# Patient Record
Sex: FEMALE | Race: WHITE | NOT HISPANIC OR LATINO
[De-identification: names, ages, dates, MRNs, and addresses within clinical notes are randomized per-mention and may not be internally consistent; named-entity substitution may affect disease eponyms.]

---

## 2017-01-24 ENCOUNTER — APPOINTMENT (OUTPATIENT)
Dept: OBGYN | Facility: CLINIC | Age: 47
End: 2017-01-24

## 2017-01-24 VITALS
SYSTOLIC BLOOD PRESSURE: 122 MMHG | HEART RATE: 85 BPM | WEIGHT: 234 LBS | DIASTOLIC BLOOD PRESSURE: 82 MMHG | HEIGHT: 67 IN | BODY MASS INDEX: 36.73 KG/M2

## 2017-01-26 LAB — HPV HIGH+LOW RISK DNA PNL CVX: NEGATIVE

## 2017-01-30 LAB — CYTOLOGY CVX/VAG DOC THIN PREP: NORMAL

## 2017-01-31 ENCOUNTER — APPOINTMENT (OUTPATIENT)
Dept: OBGYN | Facility: CLINIC | Age: 47
End: 2017-01-31

## 2017-02-13 ENCOUNTER — APPOINTMENT (OUTPATIENT)
Dept: OBGYN | Facility: CLINIC | Age: 47
End: 2017-02-13

## 2017-02-13 VITALS
BODY MASS INDEX: 35.63 KG/M2 | HEIGHT: 67 IN | DIASTOLIC BLOOD PRESSURE: 70 MMHG | SYSTOLIC BLOOD PRESSURE: 102 MMHG | WEIGHT: 227 LBS

## 2017-03-07 ENCOUNTER — FORM ENCOUNTER (OUTPATIENT)
Age: 47
End: 2017-03-07

## 2017-03-08 ENCOUNTER — OUTPATIENT (OUTPATIENT)
Dept: OUTPATIENT SERVICES | Facility: HOSPITAL | Age: 47
LOS: 1 days | End: 2017-03-08
Payer: COMMERCIAL

## 2017-03-08 VITALS
WEIGHT: 229.28 LBS | HEART RATE: 73 BPM | HEIGHT: 67 IN | TEMPERATURE: 99 F | RESPIRATION RATE: 16 BRPM | DIASTOLIC BLOOD PRESSURE: 80 MMHG | SYSTOLIC BLOOD PRESSURE: 124 MMHG

## 2017-03-08 DIAGNOSIS — Z90.5 ACQUIRED ABSENCE OF KIDNEY: Chronic | ICD-10-CM

## 2017-03-08 DIAGNOSIS — N92.1 EXCESSIVE AND FREQUENT MENSTRUATION WITH IRREGULAR CYCLE: ICD-10-CM

## 2017-03-08 DIAGNOSIS — J45.909 UNSPECIFIED ASTHMA, UNCOMPLICATED: ICD-10-CM

## 2017-03-08 DIAGNOSIS — Z01.818 ENCOUNTER FOR OTHER PREPROCEDURAL EXAMINATION: ICD-10-CM

## 2017-03-08 DIAGNOSIS — D30.02 BENIGN NEOPLASM OF LEFT KIDNEY: Chronic | ICD-10-CM

## 2017-03-08 DIAGNOSIS — C50.919 MALIGNANT NEOPLASM OF UNSPECIFIED SITE OF UNSPECIFIED FEMALE BREAST: ICD-10-CM

## 2017-03-08 DIAGNOSIS — Z90.13 ACQUIRED ABSENCE OF BILATERAL BREASTS AND NIPPLES: Chronic | ICD-10-CM

## 2017-03-08 DIAGNOSIS — Z98.890 OTHER SPECIFIED POSTPROCEDURAL STATES: Chronic | ICD-10-CM

## 2017-03-08 LAB
ANION GAP SERPL CALC-SCNC: 11 MMOL/L — SIGNIFICANT CHANGE UP (ref 5–17)
APTT BLD: 31.9 SEC — SIGNIFICANT CHANGE UP (ref 27.5–37.4)
BUN SERPL-MCNC: 16 MG/DL — SIGNIFICANT CHANGE UP (ref 8–20)
CALCIUM SERPL-MCNC: 9.1 MG/DL — SIGNIFICANT CHANGE UP (ref 8.6–10.2)
CHLORIDE SERPL-SCNC: 105 MMOL/L — SIGNIFICANT CHANGE UP (ref 98–107)
CO2 SERPL-SCNC: 26 MMOL/L — SIGNIFICANT CHANGE UP (ref 22–29)
CREAT SERPL-MCNC: 0.78 MG/DL — SIGNIFICANT CHANGE UP (ref 0.5–1.3)
GLUCOSE SERPL-MCNC: 88 MG/DL — SIGNIFICANT CHANGE UP (ref 70–115)
HCT VFR BLD CALC: 38.6 % — SIGNIFICANT CHANGE UP (ref 37–47)
HGB BLD-MCNC: 12.5 G/DL — SIGNIFICANT CHANGE UP (ref 12–16)
INR BLD: 1.09 RATIO — SIGNIFICANT CHANGE UP (ref 0.88–1.16)
MCHC RBC-ENTMCNC: 29.6 PG — SIGNIFICANT CHANGE UP (ref 27–31)
MCHC RBC-ENTMCNC: 32.4 G/DL — SIGNIFICANT CHANGE UP (ref 32–36)
MCV RBC AUTO: 91.3 FL — SIGNIFICANT CHANGE UP (ref 81–99)
PLATELET # BLD AUTO: 248 K/UL — SIGNIFICANT CHANGE UP (ref 150–400)
POTASSIUM SERPL-MCNC: 4.1 MMOL/L — SIGNIFICANT CHANGE UP (ref 3.5–5.3)
POTASSIUM SERPL-SCNC: 4.1 MMOL/L — SIGNIFICANT CHANGE UP (ref 3.5–5.3)
PROTHROM AB SERPL-ACNC: 12 SEC — SIGNIFICANT CHANGE UP (ref 10–13.1)
RBC # BLD: 4.23 M/UL — LOW (ref 4.4–5.2)
RBC # FLD: 14.5 % — SIGNIFICANT CHANGE UP (ref 11–15.6)
SODIUM SERPL-SCNC: 142 MMOL/L — SIGNIFICANT CHANGE UP (ref 135–145)
WBC # BLD: 10.6 K/UL — SIGNIFICANT CHANGE UP (ref 4.8–10.8)
WBC # FLD AUTO: 10.6 K/UL — SIGNIFICANT CHANGE UP (ref 4.8–10.8)

## 2017-03-08 PROCEDURE — 93005 ELECTROCARDIOGRAM TRACING: CPT

## 2017-03-08 PROCEDURE — 85610 PROTHROMBIN TIME: CPT

## 2017-03-08 PROCEDURE — 85027 COMPLETE CBC AUTOMATED: CPT

## 2017-03-08 PROCEDURE — 85730 THROMBOPLASTIN TIME PARTIAL: CPT

## 2017-03-08 PROCEDURE — G0463: CPT

## 2017-03-08 PROCEDURE — 93010 ELECTROCARDIOGRAM REPORT: CPT

## 2017-03-08 PROCEDURE — 71020: CPT | Mod: 26

## 2017-03-08 PROCEDURE — 71046 X-RAY EXAM CHEST 2 VIEWS: CPT

## 2017-03-08 PROCEDURE — 80048 BASIC METABOLIC PNL TOTAL CA: CPT

## 2017-03-08 NOTE — H&P PST ADULT - PSH
H/O partial nephrectomy  Left  Kidney tumor (benign), left    S/P lumbar discectomy  L5-S1  S/P mastectomy, bilateral  Reconstructive breast surgery

## 2017-03-08 NOTE — H&P PST ADULT - NSANTHOSAYNRD_GEN_A_CORE
No. LESLIE screening performed.  STOP BANG Legend: 0-2 = LOW Risk; 3-4 = INTERMEDIATE Risk; 5-8 = HIGH Risk

## 2017-03-08 NOTE — H&P PST ADULT - FAMILY HISTORY
Mother  Still living? No  Family history of colon cancer, Age at diagnosis: 41-50  Family history of hypertension, Age at diagnosis: Age Unknown     Father  Still living? No  Family history of thyroid cancer, Age at diagnosis: 31-40  Family history of cerebrovascular accident (CVA), Age at diagnosis: Age Unknown

## 2017-03-08 NOTE — H&P PST ADULT - MAMMOGRAM, RESULTS OF LAST, PROFILE
Diagnosed with Bilateral breast cancer   (No longer gets mammography as pt has own breast tissue from reconstructive breast surgery)

## 2017-03-08 NOTE — H&P PST ADULT - HISTORY OF PRESENT ILLNESS
This is a 46 y.o female who presents to PST today.  The pt reports she has been experiencing menometrorrhagia and is now scheduled to have a D & C in the near future.  A recent sonogram demonstrated a small polyp for which she will also have a polypectomy.

## 2017-03-09 DIAGNOSIS — Z01.818 ENCOUNTER FOR OTHER PREPROCEDURAL EXAMINATION: ICD-10-CM

## 2017-03-09 DIAGNOSIS — N92.0 EXCESSIVE AND FREQUENT MENSTRUATION WITH REGULAR CYCLE: ICD-10-CM

## 2017-03-21 ENCOUNTER — RESULT REVIEW (OUTPATIENT)
Age: 47
End: 2017-03-21

## 2017-03-22 ENCOUNTER — TRANSCRIPTION ENCOUNTER (OUTPATIENT)
Age: 47
End: 2017-03-22

## 2017-03-22 ENCOUNTER — OUTPATIENT (OUTPATIENT)
Dept: OUTPATIENT SERVICES | Facility: HOSPITAL | Age: 47
LOS: 1 days | End: 2017-03-22
Payer: COMMERCIAL

## 2017-03-22 VITALS
SYSTOLIC BLOOD PRESSURE: 122 MMHG | OXYGEN SATURATION: 97 % | HEART RATE: 76 BPM | TEMPERATURE: 99 F | DIASTOLIC BLOOD PRESSURE: 77 MMHG | RESPIRATION RATE: 15 BRPM

## 2017-03-22 VITALS
SYSTOLIC BLOOD PRESSURE: 120 MMHG | DIASTOLIC BLOOD PRESSURE: 68 MMHG | TEMPERATURE: 98 F | HEART RATE: 82 BPM | RESPIRATION RATE: 16 BRPM | HEIGHT: 67 IN | WEIGHT: 220.02 LBS | OXYGEN SATURATION: 96 %

## 2017-03-22 DIAGNOSIS — N84.0 POLYP OF CORPUS UTERI: ICD-10-CM

## 2017-03-22 DIAGNOSIS — D30.02 BENIGN NEOPLASM OF LEFT KIDNEY: Chronic | ICD-10-CM

## 2017-03-22 DIAGNOSIS — Z90.13 ACQUIRED ABSENCE OF BILATERAL BREASTS AND NIPPLES: Chronic | ICD-10-CM

## 2017-03-22 DIAGNOSIS — N92.0 EXCESSIVE AND FREQUENT MENSTRUATION WITH REGULAR CYCLE: ICD-10-CM

## 2017-03-22 DIAGNOSIS — N92.1 EXCESSIVE AND FREQUENT MENSTRUATION WITH IRREGULAR CYCLE: ICD-10-CM

## 2017-03-22 DIAGNOSIS — Z98.890 OTHER SPECIFIED POSTPROCEDURAL STATES: Chronic | ICD-10-CM

## 2017-03-22 DIAGNOSIS — Z90.5 ACQUIRED ABSENCE OF KIDNEY: Chronic | ICD-10-CM

## 2017-03-22 PROCEDURE — 58558 HYSTEROSCOPY BIOPSY: CPT

## 2017-03-22 PROCEDURE — 88305 TISSUE EXAM BY PATHOLOGIST: CPT | Mod: 26

## 2017-03-22 PROCEDURE — 88305 TISSUE EXAM BY PATHOLOGIST: CPT

## 2017-03-22 RX ORDER — TAMOXIFEN CITRATE 20 MG/1
0 TABLET, FILM COATED ORAL
Qty: 0 | Refills: 0 | COMMUNITY

## 2017-03-22 RX ORDER — ZOLPIDEM TARTRATE 10 MG/1
0 TABLET ORAL
Qty: 0 | Refills: 0 | COMMUNITY

## 2017-03-22 RX ORDER — SODIUM CHLORIDE 9 MG/ML
1000 INJECTION, SOLUTION INTRAVENOUS
Qty: 0 | Refills: 0 | Status: DISCONTINUED | OUTPATIENT
Start: 2017-03-22 | End: 2017-03-22

## 2017-03-22 RX ORDER — PANTOPRAZOLE SODIUM 20 MG/1
1 TABLET, DELAYED RELEASE ORAL
Qty: 0 | Refills: 0 | COMMUNITY

## 2017-03-22 RX ORDER — CYCLOBENZAPRINE HYDROCHLORIDE 10 MG/1
0 TABLET, FILM COATED ORAL
Qty: 0 | Refills: 0 | COMMUNITY

## 2017-03-22 RX ORDER — OXYCODONE HYDROCHLORIDE 5 MG/1
10 TABLET ORAL EVERY 6 HOURS
Qty: 0 | Refills: 0 | Status: DISCONTINUED | OUTPATIENT
Start: 2017-03-22 | End: 2017-03-22

## 2017-03-22 RX ORDER — SODIUM CHLORIDE 9 MG/ML
3 INJECTION INTRAMUSCULAR; INTRAVENOUS; SUBCUTANEOUS ONCE
Qty: 0 | Refills: 0 | Status: DISCONTINUED | OUTPATIENT
Start: 2017-03-22 | End: 2017-03-22

## 2017-03-22 RX ORDER — FENTANYL CITRATE 50 UG/ML
50 INJECTION INTRAVENOUS
Qty: 0 | Refills: 0 | Status: DISCONTINUED | OUTPATIENT
Start: 2017-03-22 | End: 2017-03-22

## 2017-03-22 RX ORDER — ONDANSETRON 8 MG/1
4 TABLET, FILM COATED ORAL ONCE
Qty: 0 | Refills: 0 | Status: DISCONTINUED | OUTPATIENT
Start: 2017-03-22 | End: 2017-03-22

## 2017-03-22 RX ADMIN — OXYCODONE HYDROCHLORIDE 10 MILLIGRAM(S): 5 TABLET ORAL at 09:25

## 2017-03-24 LAB — SURGICAL PATHOLOGY FINAL REPORT - CH: SIGNIFICANT CHANGE UP

## 2017-05-10 ENCOUNTER — LABORATORY RESULT (OUTPATIENT)
Age: 47
End: 2017-05-10

## 2017-05-10 ENCOUNTER — APPOINTMENT (OUTPATIENT)
Dept: OBGYN | Facility: CLINIC | Age: 47
End: 2017-05-10

## 2017-05-10 VITALS
BODY MASS INDEX: 35.47 KG/M2 | WEIGHT: 226 LBS | HEIGHT: 67 IN | SYSTOLIC BLOOD PRESSURE: 120 MMHG | DIASTOLIC BLOOD PRESSURE: 80 MMHG

## 2017-05-10 RX ORDER — TRANEXAMIC ACID 650 MG/1
650 TABLET ORAL
Qty: 10 | Refills: 0 | Status: DISCONTINUED | COMMUNITY
Start: 2017-05-10 | End: 2017-05-10

## 2017-12-07 ENCOUNTER — RX RENEWAL (OUTPATIENT)
Age: 47
End: 2017-12-07

## 2017-12-20 ENCOUNTER — APPOINTMENT (OUTPATIENT)
Dept: NEUROLOGY | Facility: CLINIC | Age: 47
End: 2017-12-20
Payer: COMMERCIAL

## 2017-12-20 VITALS
SYSTOLIC BLOOD PRESSURE: 124 MMHG | DIASTOLIC BLOOD PRESSURE: 82 MMHG | WEIGHT: 215 LBS | BODY MASS INDEX: 33.74 KG/M2 | HEIGHT: 67 IN

## 2017-12-20 PROCEDURE — 99214 OFFICE O/P EST MOD 30 MIN: CPT

## 2018-03-29 ENCOUNTER — NON-APPOINTMENT (OUTPATIENT)
Age: 48
End: 2018-03-29

## 2018-03-29 ENCOUNTER — APPOINTMENT (OUTPATIENT)
Dept: FAMILY MEDICINE | Facility: CLINIC | Age: 48
End: 2018-03-29
Payer: COMMERCIAL

## 2018-03-29 VITALS
SYSTOLIC BLOOD PRESSURE: 124 MMHG | HEART RATE: 74 BPM | DIASTOLIC BLOOD PRESSURE: 76 MMHG | BODY MASS INDEX: 35 KG/M2 | HEIGHT: 67 IN | WEIGHT: 223 LBS

## 2018-03-29 DIAGNOSIS — I89.0 LYMPHEDEMA, NOT ELSEWHERE CLASSIFIED: ICD-10-CM

## 2018-03-29 DIAGNOSIS — K29.70 GASTRITIS, UNSPECIFIED, W/OUT BLEEDING: ICD-10-CM

## 2018-03-29 DIAGNOSIS — Z87.891 PERSONAL HISTORY OF NICOTINE DEPENDENCE: ICD-10-CM

## 2018-03-29 DIAGNOSIS — I51.9 HEART DISEASE, UNSPECIFIED: ICD-10-CM

## 2018-03-29 PROCEDURE — 99213 OFFICE O/P EST LOW 20 MIN: CPT | Mod: 25

## 2018-03-29 PROCEDURE — 99386 PREV VISIT NEW AGE 40-64: CPT | Mod: 25

## 2018-03-29 PROCEDURE — 93000 ELECTROCARDIOGRAM COMPLETE: CPT

## 2018-03-29 RX ORDER — TORSEMIDE 10 MG/1
10 TABLET ORAL
Qty: 90 | Refills: 1 | Status: ACTIVE | COMMUNITY

## 2018-03-29 RX ORDER — POTASSIUM CHLORIDE 750 MG/1
10 CAPSULE, EXTENDED RELEASE ORAL
Qty: 30 | Refills: 0 | Status: ACTIVE | COMMUNITY

## 2018-03-29 RX ORDER — TRANEXAMIC ACID 650 MG/1
650 TABLET ORAL
Qty: 10 | Refills: 0 | Status: DISCONTINUED | COMMUNITY
Start: 2017-05-10 | End: 2018-03-29

## 2018-03-31 PROBLEM — K29.70 GASTRITIS: Status: ACTIVE | Noted: 2018-03-29

## 2018-04-06 ENCOUNTER — APPOINTMENT (OUTPATIENT)
Dept: OBGYN | Facility: CLINIC | Age: 48
End: 2018-04-06

## 2018-04-16 ENCOUNTER — APPOINTMENT (OUTPATIENT)
Dept: FAMILY MEDICINE | Facility: CLINIC | Age: 48
End: 2018-04-16
Payer: COMMERCIAL

## 2018-04-16 VITALS
HEART RATE: 70 BPM | OXYGEN SATURATION: 98 % | BODY MASS INDEX: 35 KG/M2 | HEIGHT: 67 IN | TEMPERATURE: 98.4 F | DIASTOLIC BLOOD PRESSURE: 78 MMHG | WEIGHT: 223 LBS | SYSTOLIC BLOOD PRESSURE: 120 MMHG

## 2018-04-16 PROCEDURE — 99214 OFFICE O/P EST MOD 30 MIN: CPT

## 2018-04-27 ENCOUNTER — APPOINTMENT (OUTPATIENT)
Dept: OBGYN | Facility: CLINIC | Age: 48
End: 2018-04-27
Payer: COMMERCIAL

## 2018-04-27 VITALS
HEIGHT: 67 IN | HEART RATE: 73 BPM | DIASTOLIC BLOOD PRESSURE: 75 MMHG | SYSTOLIC BLOOD PRESSURE: 114 MMHG | WEIGHT: 223 LBS | BODY MASS INDEX: 35 KG/M2

## 2018-04-27 PROCEDURE — 99396 PREV VISIT EST AGE 40-64: CPT

## 2018-04-27 RX ORDER — METHYLPREDNISOLONE 4 MG/1
4 TABLET ORAL
Qty: 1 | Refills: 0 | Status: COMPLETED | COMMUNITY
Start: 2018-04-16 | End: 2018-04-27

## 2018-04-30 LAB — HPV HIGH+LOW RISK DNA PNL CVX: NOT DETECTED

## 2018-05-03 LAB — CYTOLOGY CVX/VAG DOC THIN PREP: NORMAL

## 2018-06-28 ENCOUNTER — APPOINTMENT (OUTPATIENT)
Dept: DERMATOLOGY | Facility: CLINIC | Age: 48
End: 2018-06-28

## 2018-07-18 ENCOUNTER — APPOINTMENT (OUTPATIENT)
Dept: FAMILY MEDICINE | Facility: CLINIC | Age: 48
End: 2018-07-18
Payer: COMMERCIAL

## 2018-07-18 VITALS
DIASTOLIC BLOOD PRESSURE: 64 MMHG | HEART RATE: 99 BPM | BODY MASS INDEX: 35.51 KG/M2 | SYSTOLIC BLOOD PRESSURE: 106 MMHG | HEIGHT: 67 IN | WEIGHT: 226.25 LBS

## 2018-07-18 DIAGNOSIS — J06.9 ACUTE UPPER RESPIRATORY INFECTION, UNSPECIFIED: ICD-10-CM

## 2018-07-18 PROBLEM — J45.909 UNSPECIFIED ASTHMA, UNCOMPLICATED: Chronic | Status: ACTIVE | Noted: 2017-03-08

## 2018-07-18 PROBLEM — D30.02 BENIGN NEOPLASM OF LEFT KIDNEY: Chronic | Status: ACTIVE | Noted: 2017-03-08

## 2018-07-18 PROCEDURE — 99214 OFFICE O/P EST MOD 30 MIN: CPT

## 2018-07-18 RX ORDER — CYCLOBENZAPRINE HYDROCHLORIDE 10 MG/1
10 TABLET, FILM COATED ORAL 3 TIMES DAILY
Qty: 45 | Refills: 3 | Status: DISCONTINUED | COMMUNITY
Start: 2017-12-20 | End: 2018-07-18

## 2018-07-18 RX ORDER — PANTOPRAZOLE 40 MG/1
40 TABLET, DELAYED RELEASE ORAL
Refills: 0 | Status: DISCONTINUED | COMMUNITY
End: 2018-07-18

## 2018-07-30 PROBLEM — J06.9 ACUTE UPPER RESPIRATORY INFECTION: Status: RESOLVED | Noted: 2018-04-19 | Resolved: 2018-07-30

## 2018-07-30 NOTE — REVIEW OF SYSTEMS
[Fever] : no fever [Fatigue] : no fatigue [Chest Pain] : no chest pain [Palpitations] : no palpitations

## 2018-07-30 NOTE — HISTORY OF PRESENT ILLNESS
[FreeTextEntry1] : F/U FROM HOSPITAL [de-identified] : PRESENTING FOR FOLLOW-UP.  ON 7/10/18 - LEFT HIP RE[facility]  GETTING PHYSICAL THERAPY AT HOME - FIVE DAYS A WEEK.  HAS A VISITING NURSE TWICE A WEEK.  HAS HAD NO FALLS.  NOTES THAT SWELLING IN HER LEFT LEG HAS BEEN "GREAT" AND IMPROVED SINCE PROCEDURE.  HAD CARDIAC CLEARANCE - DR. GAVI BOCANEGRA - HAD EKG.  TAMOXIFEN ON HOLD FROM SURGERY.  NO FEVER, COUGH OR SOB, NO FALLS.  TAKING STOOL SOFTENER - COLACE.  HAD A BOWEL MOVEMENT YESTERDAY.  HAS SOME SORENESS TO HIP.  TO SEE SURGERY AUGUST 22, 2018 IN FOLLOW-UP.   HAS HAD NO HEADACHE, DIZZINESS, CHEST PAIN, SHORTNESS OF BREATH, GI OR URINARY COMPLAINTS.  ASTHMA VERY WELL CONTROLLED.  STILL ON SINGULAIR.  HAS NO OTHER COMPLAINTS TODAY.

## 2018-07-30 NOTE — PLAN
[FreeTextEntry1] : HAS PHYSICAL THERAPY AT HOME AS WELL AS VISITING NURSE\par FALL PRECAUTION\par ASTHMA CONTROLLED\par FOLLOW-UP WITH SURGERY AS DIRECTED\par REMAIN HYDRATED AND FIBER\par FOLLOW-UP ALL SPECIALISTS AS DIRECTED \par CALL WITH ANY QUESTIONS, CONCERNS OR CHANGES

## 2018-07-30 NOTE — PHYSICAL EXAM
[No Acute Distress] : no acute distress [Well Nourished] : well nourished [Well-Appearing] : well-appearing [Supple] : supple [No Lymphadenopathy] : no lymphadenopathy [No Respiratory Distress] : no respiratory distress  [Clear to Auscultation] : lungs were clear to auscultation bilaterally [No Accessory Muscle Use] : no accessory muscle use [Normal Rate] : normal rate  [Regular Rhythm] : with a regular rhythm [Normal S1, S2] : normal S1 and S2 [Soft] : abdomen soft [Non Tender] : non-tender [Normal Bowel Sounds] : normal bowel sounds [Coordination Grossly Intact] : coordination grossly intact [No Focal Deficits] : no focal deficits [de-identified] : DISTAL PULSES INTACT.  NON-TENDER.  INCISION C/D/I

## 2018-07-31 ENCOUNTER — APPOINTMENT (OUTPATIENT)
Dept: GASTROENTEROLOGY | Facility: CLINIC | Age: 48
End: 2018-07-31
Payer: COMMERCIAL

## 2018-07-31 VITALS
RESPIRATION RATE: 16 BRPM | SYSTOLIC BLOOD PRESSURE: 128 MMHG | HEART RATE: 85 BPM | HEIGHT: 67 IN | OXYGEN SATURATION: 98 % | DIASTOLIC BLOOD PRESSURE: 70 MMHG

## 2018-07-31 DIAGNOSIS — K64.0 FIRST DEGREE HEMORRHOIDS: ICD-10-CM

## 2018-07-31 DIAGNOSIS — Z80.0 FAMILY HISTORY OF MALIGNANT NEOPLASM OF DIGESTIVE ORGANS: ICD-10-CM

## 2018-07-31 PROCEDURE — 99204 OFFICE O/P NEW MOD 45 MIN: CPT

## 2018-08-02 ENCOUNTER — EMERGENCY (EMERGENCY)
Facility: HOSPITAL | Age: 48
LOS: 1 days | Discharge: HOME | End: 2018-08-02
Attending: EMERGENCY MEDICINE | Admitting: EMERGENCY MEDICINE
Payer: COMMERCIAL

## 2018-08-02 VITALS
RESPIRATION RATE: 16 BRPM | HEART RATE: 90 BPM | OXYGEN SATURATION: 98 % | TEMPERATURE: 99 F | SYSTOLIC BLOOD PRESSURE: 130 MMHG | DIASTOLIC BLOOD PRESSURE: 75 MMHG

## 2018-08-02 DIAGNOSIS — Z98.890 OTHER SPECIFIED POSTPROCEDURAL STATES: Chronic | ICD-10-CM

## 2018-08-02 DIAGNOSIS — Z90.13 ACQUIRED ABSENCE OF BILATERAL BREASTS AND NIPPLES: Chronic | ICD-10-CM

## 2018-08-02 DIAGNOSIS — Z90.5 ACQUIRED ABSENCE OF KIDNEY: Chronic | ICD-10-CM

## 2018-08-02 DIAGNOSIS — D30.02 BENIGN NEOPLASM OF LEFT KIDNEY: Chronic | ICD-10-CM

## 2018-08-02 PROCEDURE — 99284 EMERGENCY DEPT VISIT MOD MDM: CPT

## 2018-08-02 RX ORDER — DEXAMETHASONE 0.5 MG/5ML
6 ELIXIR ORAL ONCE
Qty: 0 | Refills: 0 | Status: COMPLETED | OUTPATIENT
Start: 2018-08-02 | End: 2018-08-02

## 2018-08-02 RX ORDER — FAMOTIDINE 10 MG/ML
1 INJECTION INTRAVENOUS
Qty: 14 | Refills: 0 | OUTPATIENT
Start: 2018-08-02 | End: 2018-08-08

## 2018-08-02 RX ORDER — FAMOTIDINE 10 MG/ML
20 INJECTION INTRAVENOUS ONCE
Qty: 0 | Refills: 0 | Status: COMPLETED | OUTPATIENT
Start: 2018-08-02 | End: 2018-08-02

## 2018-08-02 RX ORDER — RIVAROXABAN 15 MG-20MG
10 KIT ORAL ONCE
Qty: 0 | Refills: 0 | Status: COMPLETED | OUTPATIENT
Start: 2018-08-02 | End: 2018-08-02

## 2018-08-02 RX ORDER — RIVAROXABAN 15 MG-20MG
1 KIT ORAL
Qty: 14 | Refills: 0 | OUTPATIENT
Start: 2018-08-02 | End: 2018-08-15

## 2018-08-02 RX ADMIN — Medication 6 MILLIGRAM(S): at 13:18

## 2018-08-02 RX ADMIN — RIVAROXABAN 10 MILLIGRAM(S): KIT at 15:20

## 2018-08-02 RX ADMIN — FAMOTIDINE 20 MILLIGRAM(S): 10 INJECTION INTRAVENOUS at 13:18

## 2018-08-02 NOTE — ED PROVIDER NOTE - PLAN OF CARE
Three medications have been sent to your pharmacy, take these medications as prescribed. Follow up with your surgeon and primary doctor as soon as possible.  Advance activity as tolerated.  Continue all previously prescribed medications as directed.  Follow up with your primary care physician in 48-72 hours- bring copies of your results.  Return to the ER for worsening or persistent symptoms, and/or ANY NEW OR CONCERNING SYMPTOMS. This includes any kind of bleeding or dark stools, worsening shortness of breath, difficulty breathing, rash or any other reaction that you notice. If you have issues obtaining follow up, please call: [phone] to obtain a doctor or specialist who takes your insurance in your area.  You may call [phone] to make an appointment with the internal medicine clinic.

## 2018-08-02 NOTE — ED PROVIDER NOTE - CARE PLAN
Principal Discharge DX:	Allergic reaction caused by a drug Principal Discharge DX:	Allergic reaction caused by a drug  Assessment and plan of treatment:	Three medications have been sent to your pharmacy, take these medications as prescribed. Follow up with your surgeon and primary doctor as soon as possible.  Advance activity as tolerated.  Continue all previously prescribed medications as directed.  Follow up with your primary care physician in 48-72 hours- bring copies of your results.  Return to the ER for worsening or persistent symptoms, and/or ANY NEW OR CONCERNING SYMPTOMS. This includes any kind of bleeding or dark stools, worsening shortness of breath, difficulty breathing, rash or any other reaction that you notice. If you have issues obtaining follow up, please call: [phone] to obtain a doctor or specialist who takes your insurance in your area.  You may call [phone] to make an appointment with the internal medicine clinic.

## 2018-08-02 NOTE — ED PROVIDER NOTE - OBJECTIVE STATEMENT
47 y/o female with PMHx of Asthma and Breast CA on immunotherapy and ASA presenting to the ED today for a rash X 1 day. Pt states she was started on ASA 325mg which she took yesterday and shortly after started to have a rash with itchiness to her abd and then again today with some fullness to her throat. Pt states she took Benadryl with some relief. Pt denies any fever, chills, nausea, vomiting, chest pain, or abd pain.

## 2018-08-02 NOTE — ED PROVIDER NOTE - ATTENDING CONTRIBUTION TO CARE
Pt was seen and evaluated by me. Pt is a 47 y/o female with PMHx of Asthma and Breast CA on immunotherapy and ASA presenting to the ED today for a rash X 1 day. Pt states she was started on ASA 325mg which she took yesterday and shortly after started to have a rash with itchiness to her abd and then again today with some fullness to her throat. Pt states she took Benadryl with some relief. Pt denies any fever, chills, nausea, vomiting, chest pain, or abd pain.

## 2018-08-02 NOTE — ED PROVIDER NOTE - PROGRESS NOTE DETAILS
HUMBERTO Jesus; Agree with attending, patient had an allergic rxn to asa, will likely be safe for D/C with prednisone, called patient's surgeon Dr. Razo to evaluate which anticoagulant is appropriate. HUMBERTO Jesus; Agree with attending, patient had an allergic rxn to asa, will likely be safe for D/C with prednisone, called patient's surgeon Dr. Razo to evaluate which anticoagulant is appropriate. Awaiting callback. HUMBERTO Jesus: Spoke with Dr. Pittman's office including Meghann Saenz asking about dvt prophylaxis and informing the office pt likely requires steroids as pt is now allergic to asa and has two type one reactions. Call was referred to Dr Morejon, currently awaiting callback. HUMBERTO Jesus; Patient tolerated first dose of xerelto without reaction, pt safe for D/C. Pt to be given Xerelto 10 daily as recommended by Dr. Morejon and to f/u with Dr Razo. Pt also to be given prednisone and pepcid for rxn which was discussed with Dr. Morejon. Dr. Rios: Pt was observed for 30 mins after Xarelto with no new rashes. Tolerating PO. Hives improved. Sent Prednisone and Xarelto to pharmacy.

## 2018-08-02 NOTE — ED ADULT NURSE NOTE - OBJECTIVE STATEMENT
Alert and oriented x 4. Pt received to spot 8 intake due to allergic reaction. Pt states : " I took aspirin last night and has a lump in my through. It went away after I took benadryl. I took it again this morning and I had a lump in my throat again with itching and hives. I had allergies to aspirin as a child but I have been taking it since early July." Py has no itching, hives , swelling or difficulty breathing at this time." Pt denies chest pain, shortness of breath, nausea, vomiting or dizziness. No painful urination or diarrhea.  IV 20g to right AC. Medication given as ordered.  Will continue to monitor.

## 2018-08-02 NOTE — ED PROVIDER NOTE - MEDICAL DECISION MAKING DETAILS
49 y/o female with PMHx of Asthma and Breast CA on immunotherapy and ASA presenting to the ED today for a rash X 1 day. Concern for allergic reaction. Steroids, already took benadryl.

## 2018-08-02 NOTE — ED ADULT TRIAGE NOTE - CHIEF COMPLAINT QUOTE
Pt. c/o hives on trunk, legs and scalp starting last night. States it started after taking aspirin last night and then again this AM. Had an allergy to aspirin years ago with a rash. Also c/o lump in throat. Able to swallow PO/liquids this AM. No difficulty breathing. Took Benadryl 50mg on car ride over.

## 2018-08-04 ENCOUNTER — INPATIENT (INPATIENT)
Facility: HOSPITAL | Age: 48
LOS: 0 days | Discharge: HOME | DRG: 916 | End: 2018-08-05
Attending: INTERNAL MEDICINE | Admitting: INTERNAL MEDICINE
Payer: COMMERCIAL

## 2018-08-04 VITALS
SYSTOLIC BLOOD PRESSURE: 120 MMHG | RESPIRATION RATE: 16 BRPM | WEIGHT: 214.95 LBS | DIASTOLIC BLOOD PRESSURE: 76 MMHG | HEART RATE: 82 BPM | TEMPERATURE: 99 F | HEIGHT: 67 IN | OXYGEN SATURATION: 97 %

## 2018-08-04 DIAGNOSIS — T78.3XXA ANGIONEUROTIC EDEMA, INITIAL ENCOUNTER: ICD-10-CM

## 2018-08-04 DIAGNOSIS — Z90.13 ACQUIRED ABSENCE OF BILATERAL BREASTS AND NIPPLES: Chronic | ICD-10-CM

## 2018-08-04 DIAGNOSIS — Z90.5 ACQUIRED ABSENCE OF KIDNEY: Chronic | ICD-10-CM

## 2018-08-04 DIAGNOSIS — D30.02 BENIGN NEOPLASM OF LEFT KIDNEY: Chronic | ICD-10-CM

## 2018-08-04 DIAGNOSIS — Z98.890 OTHER SPECIFIED POSTPROCEDURAL STATES: Chronic | ICD-10-CM

## 2018-08-04 LAB
ALBUMIN SERPL ELPH-MCNC: 3.8 G/DL — SIGNIFICANT CHANGE UP (ref 3.3–5.2)
ALP SERPL-CCNC: 75 U/L — SIGNIFICANT CHANGE UP (ref 40–120)
ALT FLD-CCNC: 17 U/L — SIGNIFICANT CHANGE UP
ANION GAP SERPL CALC-SCNC: 17 MMOL/L — SIGNIFICANT CHANGE UP (ref 5–17)
AST SERPL-CCNC: 24 U/L — SIGNIFICANT CHANGE UP
BASOPHILS # BLD AUTO: 0 K/UL — SIGNIFICANT CHANGE UP (ref 0–0.2)
BASOPHILS NFR BLD AUTO: 0.1 % — SIGNIFICANT CHANGE UP (ref 0–2)
BILIRUB SERPL-MCNC: <0.2 MG/DL — LOW (ref 0.4–2)
BUN SERPL-MCNC: 21 MG/DL — HIGH (ref 8–20)
CALCIUM SERPL-MCNC: 9.1 MG/DL — SIGNIFICANT CHANGE UP (ref 8.6–10.2)
CHLORIDE SERPL-SCNC: 106 MMOL/L — SIGNIFICANT CHANGE UP (ref 98–107)
CO2 SERPL-SCNC: 21 MMOL/L — LOW (ref 22–29)
CREAT SERPL-MCNC: 0.73 MG/DL — SIGNIFICANT CHANGE UP (ref 0.5–1.3)
EOSINOPHIL # BLD AUTO: 0 K/UL — SIGNIFICANT CHANGE UP (ref 0–0.5)
EOSINOPHIL NFR BLD AUTO: 0.1 % — SIGNIFICANT CHANGE UP (ref 0–6)
GLUCOSE SERPL-MCNC: 90 MG/DL — SIGNIFICANT CHANGE UP (ref 70–115)
HCT VFR BLD CALC: 35.5 % — LOW (ref 37–47)
HGB BLD-MCNC: 11 G/DL — LOW (ref 12–16)
LYMPHOCYTES # BLD AUTO: 1.2 K/UL — SIGNIFICANT CHANGE UP (ref 1–4.8)
LYMPHOCYTES # BLD AUTO: 7.8 % — LOW (ref 20–55)
MCHC RBC-ENTMCNC: 28.6 PG — SIGNIFICANT CHANGE UP (ref 27–31)
MCHC RBC-ENTMCNC: 31 G/DL — LOW (ref 32–36)
MCV RBC AUTO: 92.2 FL — SIGNIFICANT CHANGE UP (ref 81–99)
MONOCYTES # BLD AUTO: 0.3 K/UL — SIGNIFICANT CHANGE UP (ref 0–0.8)
MONOCYTES NFR BLD AUTO: 1.8 % — LOW (ref 3–10)
NEUTROPHILS # BLD AUTO: 13.7 K/UL — HIGH (ref 1.8–8)
NEUTROPHILS NFR BLD AUTO: 89.5 % — HIGH (ref 37–73)
PLATELET # BLD AUTO: 387 K/UL — SIGNIFICANT CHANGE UP (ref 150–400)
POTASSIUM SERPL-MCNC: 4 MMOL/L — SIGNIFICANT CHANGE UP (ref 3.5–5.3)
POTASSIUM SERPL-SCNC: 4 MMOL/L — SIGNIFICANT CHANGE UP (ref 3.5–5.3)
PROT SERPL-MCNC: 7.8 G/DL — SIGNIFICANT CHANGE UP (ref 6.6–8.7)
RBC # BLD: 3.85 M/UL — LOW (ref 4.4–5.2)
RBC # FLD: 14.2 % — SIGNIFICANT CHANGE UP (ref 11–15.6)
SODIUM SERPL-SCNC: 144 MMOL/L — SIGNIFICANT CHANGE UP (ref 135–145)
WBC # BLD: 15.3 K/UL — HIGH (ref 4.8–10.8)
WBC # FLD AUTO: 15.3 K/UL — HIGH (ref 4.8–10.8)

## 2018-08-04 PROCEDURE — 99285 EMERGENCY DEPT VISIT HI MDM: CPT

## 2018-08-04 PROCEDURE — 99223 1ST HOSP IP/OBS HIGH 75: CPT

## 2018-08-04 RX ORDER — FAMOTIDINE 10 MG/ML
40 INJECTION INTRAVENOUS
Qty: 0 | Refills: 0 | Status: DISCONTINUED | OUTPATIENT
Start: 2018-08-04 | End: 2018-08-04

## 2018-08-04 RX ORDER — SODIUM CHLORIDE 9 MG/ML
1000 INJECTION INTRAMUSCULAR; INTRAVENOUS; SUBCUTANEOUS ONCE
Qty: 0 | Refills: 0 | Status: COMPLETED | OUTPATIENT
Start: 2018-08-04 | End: 2018-08-04

## 2018-08-04 RX ORDER — DIPHENHYDRAMINE HCL 50 MG
50 CAPSULE ORAL EVERY 6 HOURS
Qty: 0 | Refills: 0 | Status: DISCONTINUED | OUTPATIENT
Start: 2018-08-04 | End: 2018-08-05

## 2018-08-04 RX ORDER — DIPHENHYDRAMINE HCL 50 MG
50 CAPSULE ORAL EVERY 6 HOURS
Qty: 0 | Refills: 0 | Status: DISCONTINUED | OUTPATIENT
Start: 2018-08-04 | End: 2018-08-04

## 2018-08-04 RX ORDER — SODIUM CHLORIDE 9 MG/ML
3 INJECTION INTRAMUSCULAR; INTRAVENOUS; SUBCUTANEOUS ONCE
Qty: 0 | Refills: 0 | Status: COMPLETED | OUTPATIENT
Start: 2018-08-04 | End: 2018-08-04

## 2018-08-04 RX ORDER — FAMOTIDINE 10 MG/ML
20 INJECTION INTRAVENOUS
Qty: 0 | Refills: 0 | Status: DISCONTINUED | OUTPATIENT
Start: 2018-08-04 | End: 2018-08-05

## 2018-08-04 RX ORDER — RIVAROXABAN 15 MG-20MG
20 KIT ORAL EVERY 24 HOURS
Qty: 0 | Refills: 0 | Status: DISCONTINUED | OUTPATIENT
Start: 2018-08-04 | End: 2018-08-05

## 2018-08-04 RX ORDER — ALBUTEROL 90 UG/1
2.5 AEROSOL, METERED ORAL EVERY 6 HOURS
Qty: 0 | Refills: 0 | Status: DISCONTINUED | OUTPATIENT
Start: 2018-08-04 | End: 2018-08-05

## 2018-08-04 RX ORDER — DIPHENHYDRAMINE HCL 50 MG
50 CAPSULE ORAL ONCE
Qty: 0 | Refills: 0 | Status: COMPLETED | OUTPATIENT
Start: 2018-08-04 | End: 2018-08-04

## 2018-08-04 RX ORDER — TAMOXIFEN CITRATE 20 MG/1
20 TABLET, FILM COATED ORAL AT BEDTIME
Qty: 0 | Refills: 0 | Status: DISCONTINUED | OUTPATIENT
Start: 2018-08-04 | End: 2018-08-05

## 2018-08-04 RX ADMIN — SODIUM CHLORIDE 1000 MILLILITER(S): 9 INJECTION INTRAMUSCULAR; INTRAVENOUS; SUBCUTANEOUS at 15:00

## 2018-08-04 RX ADMIN — Medication 50 MILLIGRAM(S): at 22:17

## 2018-08-04 RX ADMIN — ALBUTEROL 2.5 MILLIGRAM(S): 90 AEROSOL, METERED ORAL at 18:24

## 2018-08-04 RX ADMIN — Medication 125 MILLIGRAM(S): at 14:08

## 2018-08-04 RX ADMIN — SODIUM CHLORIDE 3 MILLILITER(S): 9 INJECTION INTRAMUSCULAR; INTRAVENOUS; SUBCUTANEOUS at 17:23

## 2018-08-04 RX ADMIN — Medication 80 MILLIGRAM(S): at 22:17

## 2018-08-04 RX ADMIN — SODIUM CHLORIDE 1000 MILLILITER(S): 9 INJECTION INTRAMUSCULAR; INTRAVENOUS; SUBCUTANEOUS at 14:08

## 2018-08-04 RX ADMIN — TAMOXIFEN CITRATE 20 MILLIGRAM(S): 20 TABLET, FILM COATED ORAL at 23:09

## 2018-08-04 RX ADMIN — Medication 50 MILLIGRAM(S): at 14:18

## 2018-08-04 RX ADMIN — SODIUM CHLORIDE 8 MILLILITER(S): 9 INJECTION INTRAMUSCULAR; INTRAVENOUS; SUBCUTANEOUS at 18:24

## 2018-08-04 RX ADMIN — FAMOTIDINE 40 MILLIGRAM(S): 10 INJECTION INTRAVENOUS at 18:24

## 2018-08-04 NOTE — H&P ADULT - NSHPPHYSICALEXAM_GEN_ALL_CORE
PHYSICAL EXAMINATION:  GENERAL: NAD, Alert and Oriented x 3 HEENT:  Tongue slightly enlarged.  Extraocular muscles are intact.  NECK: Supple.  - JVD.  CARDIOVASCULAR: RRR S1, S2.  LUNGS: CTAB, - rales, - wheezing, - rhonchi.  BACK: - CVA tenderness.  ABDOMEN: Soft, - tenderness, - distension, + BS.  EXTREMITIES: - cyanosis, - clubbing, - edema.  Healing L hip incision.  NEUROLOGIC: strength is symmetric, sensation intact, speech fluent.  PSYCHIATRIC: Calm.  - agitation.    SKIN: Macular perineal and occipital rash.

## 2018-08-04 NOTE — ED ADULT NURSE NOTE - NSIMPLEMENTINTERV_GEN_ALL_ED
Implemented All Universal Safety Interventions:  [place] to call system. Call bell, personal items and telephone within reach. Instruct patient to call for assistance. Room bathroom lighting operational. Non-slip footwear when patient is off stretcher. Physically safe environment: no spills, clutter or unnecessary equipment. Stretcher in lowest position, wheels locked, appropriate side rails in place.

## 2018-08-04 NOTE — ED STATDOCS - CARE PLAN
Principal Discharge DX:	Angioedema, initial encounter  Assessment and plan of treatment:	Admitted as per Dr. martínez.

## 2018-08-04 NOTE — ED ADULT NURSE REASSESSMENT NOTE - NS ED NURSE REASSESS COMMENT FT1
Pt feels better. Less dmitriy-orbital swelling.  States fullness of tongue persists.  IVF bolus completed.

## 2018-08-04 NOTE — ED STATDOCS - PROGRESS NOTE DETAILS
Pt treated in ED with Benadryl, Pepcid and Solu-Medrol IV in ED. Pt hives improved and lip swelling still present. Allergist contacted and Pt case discussed. Allergist ( Dr. Cueva) saw pt in ED and wants pt admitted for observation and treatment.

## 2018-08-04 NOTE — ED STATDOCS - PHYSICAL EXAMINATION
Lesions present to neck, face, abdomen, back and extremities: Lesions are raised, erythematous that blanches with compression. No sloughing with compression . No Nikolsky sign with pressure.  Face : + Hives present , Swelling to lip and tongue on examination , Uvula midline and no swelling noted to throat.   Lungs: Clear to auscultation B/L with no wheezing noted.   Hives : Present on arms bilateral, Chest and back , lower legs bilateral

## 2018-08-04 NOTE — ED ADULT NURSE NOTE - OBJECTIVE STATEMENT
pt presents to ED with allergic reaction to possible aspirin. pt states she has itching to her head, b/l eye swelling and itchy throat. pt states she was seen at [facility] two days ago and was told if her symptoms do not improve to go back to ED. pt DC'ed on benadryl and prednisone. breathing is even and unlabored. pt denies diff swallowing and breathing. pt speaks in full sentences. will continue to monitor and reassess

## 2018-08-04 NOTE — H&P ADULT - HISTORY OF PRESENT ILLNESS
48y Female PMH Breast Ca on Tamoxifen, Neuropathy, Asthma (well controlled), s/p L hip THR 7/10/18  presents c/o tongue swelling and throat closing up, lower abdominal rash and itching x 2 days.  Denies any changes to diet, nor medications.  Denies environmental exposure / insect bites.  Symptoms began 2 days ago, approximately 30 minutes after taking ASA for DVT prophylaxis.  Was seen at [facility] and given steroids and benadryl with significant improvement.  ASA was stopped at that time and pt started on Xarelto.  Her symptoms recurred today.  Some relief after IV steroids and benadryl.  Denies SOB / dysphagia.  Family reports some hoarseness of her voice earlier.  No additional complaints.     FAMILY HISTORY: reviewed and negative.  SOCIAL HISTORY: - alcohol, - IVDA, - smoking.  REVIEW OF SYSTEMS: General: - fatigue, - weight loss, - fevers, - chills.  HEENT: - headache, - hearing disturbances.  EYES: - visual disturbances, - diplopia.  CARDIOVASCULAR: - chest pain, - palpitations.  PULMONARY: - SOB, - cough, - hemoptysis.  GI: - abdominal pain, - nausea, - vomiting, - diarrhea, - constipation.  : - urinary urgency, - frequency, - dysuria.  MUSCULOSKELETAL: - arthralgias, - myalgias.  NEURO:  - weakness, - numbness.  PSYCH: - depression, - anxiety, - suicidal thoughts. SKIN: - rashes, - lesions.  All remaining ROS are negative.Allergies    aspirin (Hives)  Contac 12 Hour Allergy (Angioedema)  Levaquin (Rash)  penicillin (Rash)    Intolerances

## 2018-08-04 NOTE — H&P ADULT - NSHPLABSRESULTS_GEN_ALL_CORE
VITALS:  Vital Signs Last 24 Hrs  T(C): 37.4 (04 Aug 2018 12:18), Max: 37.4 (04 Aug 2018 12:18)  T(F): 99.4 (04 Aug 2018 12:18), Max: 99.4 (04 Aug 2018 12:18)  HR: 82 (04 Aug 2018 12:18) (82 - 82)  BP: 120/76 (04 Aug 2018 12:18) (120/76 - 120/76)  BP(mean): --  RR: 16 (04 Aug 2018 12:18) (16 - 16)  SpO2: 97% (04 Aug 2018 12:18) (97% - 97%) Daily Height in cm: 170.18 (04 Aug 2018 12:18)    Daily   CAPILLARY BLOOD GLUCOSE        I&O's Summary      LABS:                        11.0   15.3  )-----------( 387      ( 04 Aug 2018 17:26 )             35.5     08-04    144  |  106  |  21.0<H>  ----------------------------<  90  4.0   |  21.0<L>  |  0.73    Ca    9.1      04 Aug 2018 17:26    TPro  7.8  /  Alb  3.8  /  TBili  <0.2<L>  /  DBili  x   /  AST  24  /  ALT  17  /  AlkPhos  75  08-04      LIVER FUNCTIONS - ( 04 Aug 2018 17:26 )  Alb: 3.8 g/dL / Pro: 7.8 g/dL / ALK PHOS: 75 U/L / ALT: 17 U/L / AST: 24 U/L / GGT: x                   MEDICATIONS:  diphenhydrAMINE   Capsule 50 milliGRAM(s) Oral every 6 hours PRN  famotidine    Tablet 40 milliGRAM(s) Oral two times a day  methylPREDNISolone sodium succinate Injectable 40 milliGRAM(s) IV Push every 6 hours  rivaroxaban 20 milliGRAM(s) Oral every 24 hours  sodium chloride 0.9% Bolus 1000 milliLiter(s) IV Bolus once  tamoxifen 20 milliGRAM(s) Oral at bedtime

## 2018-08-04 NOTE — ED STATDOCS - OBJECTIVE STATEMENT
48 yr Old F with Hx of breast CA  and left hip surgery presented to ED with facial swelling , tongue swelling, hives to abdomen and hives to upper extremity. Pt explained that she was placed on Asprin after her hip surgery. Pt states that she have been taking Gabapentin, Meloxicam, Tamoxifen. Pt states that she developed hives to her extremity and abdomen x 4 days . Pt was seen at [facility] where she was treated with Benadryl, Dexamethasone and Pepcid. Pt says that she was discharge with Pepcid , benadryl and Prednisone. Pt admits to taking all medication and was well until last night when she developed hives, lip and tongue swelling. Pt denies nay throat closing, SOB or difficulty breathing. 48 yr Old F with Hx of breast CA  and left hip surgery presented to ED with facial swelling , tongue swelling, hives to abdomen and hives to upper extremity. Pt explained that she was placed on Asprin after her hip surgery. Pt states that she have been taking Gabapentin, Meloxicam, Tamoxifen. Pt states that she developed hives to her extremity and abdomen x 4 days . Pt was seen at [facility] where she was treated with Benadryl, Dexamethasone and Pepcid. Pt says that she was discharge with Pepcid , benadryl and Prednisone. Pt admits to taking all medication and was well until last night when she developed hives, lip and tongue swelling. Pt denies any throat closing, SOB or difficulty breathing.

## 2018-08-04 NOTE — H&P ADULT - ASSESSMENT
> Angioedema - appears to be improving.  Continue IV steroids, H2 blocker, benadryl.  Allergy input requested.  Unclear source.  Neurontin stopped.  Continue Xarelto, Tamoxifen for now.    > Leukocytosis - likely due to steroids.  > H/o Breast Cancer - continue Tamoxifen  > S/p L hip surgery - DVT Proph with Xarelto per Ortho.  > Asthma - well controlled.  Nebs prn.

## 2018-08-04 NOTE — ED STATDOCS - ATTENDING CONTRIBUTION TO CARE
After interviewing the patient, I agree with the HPI as discussed . My personal exam reveals findings consistent with those discussed. Available diagnostic studies were reviewed. I confirm the diagnosis as discussed with the ACP. The care plan articulated is consistent with our discussion of the patient's particulars. In brief, Hx [persistent facial and tongue swelling after taking aspirin and ,meloxicam ],Exam [ mild swellignof the tongue, lugs and airway clear], Plan[discussed with dr vital allergist anf wants patient admitted ]

## 2018-08-04 NOTE — CHART NOTE - NSCHARTNOTEFT_GEN_A_CORE
Called by ED HUMBERTO Clark stating that pt was seen by Allergist, Shukri Cueva, who recommends a change to standing medications. Recommends Benadryl 50mg IVP q6 hr, Pepsid 20mg BID and solumedrol 80mg q8 hr. Pt VSS and comfortable in no acute distress. Medications changed as per request and will await for note by Allergist. Will continue to monitor. Call if worsening condition or symptoms.

## 2018-08-04 NOTE — ED STATDOCS - MEDICAL DECISION MAKING DETAILS
48 yr Old F with Hx of breast CA  and left hip surgery presented to ED with facial swelling , tongue swelling, hives to abdomen and hives to upper extremity. Pt explained that she was placed on Asprin after her hip surgery. Pt states that she have been taking Gabapentin, Meloxicam, Tamoxifen. Pt states that she developed hives to her extremity and abdomen x 4 days . Pt was seen at [facility] where she was treated with Benadryl, Dexamethasone and Pepcid. Pt says that she was discharge with Pepcid , benadryl and Prednisone.

## 2018-08-04 NOTE — CONSULT NOTE ADULT - SUBJECTIVE AND OBJECTIVE BOX
48 year old female who was evaluated in [facility] for angioedema of the lips and tongue and urticarial rash with pruritis. At my recommendation she was admitted to evaluation and treatment. Tabitha was evaluated previously in [facility] ED on Aug 3, 2018. She was treated there with Decadron IV, Benadryl and Pepcid. There was improvement, but after discharge later the angioedema and urticarial rash returned. The trigger is not yet clear however. History includes the following: a. Treated for breast cancer with Tamoxifen for the past two years b. Left hip replacement on July 10, 2018 with subsequent treatment with Aspirin 325MG, Meloxicam for PRN for pain and Xarelto for clot prevention. c. Gabapentin for " neurogenic pain"/ d. There is a history of urticarial rash as a child to Penicillin and Aspirin. e. History of allergy to Levaquin (rash). f. There is no history of food allergy. g. The tongue and lips were swollen earlier in the week before admission. With treatment with Benadryl and Corticosteroids they both had resolved but both have recurred. However, there was and is no respiratory compromise, but she did feel like " There as a lump in her throat". Initial treatment included Benadryl 25 MG IV PRN, 40 MG Pepcid and Solu-medrol 125 MG IV push. There is improvement presently, however, the pruritis started to recur.  Diagnosis: At the point the most likely trigger are NSAIDS (ASA and Meloxicam). Plan: 1. As discussed with HUMBERTO Argueta I recommended the following :a Change 25 MG PRN Benadryl to 50 MG IV q6h. b. Continue Solu-Medrol at 80 MG IV Push q6h. c. Change Pepcid to IV 20MG q12h. d. Follow blood glucose (since on Solu-Medrol). There is no history of diabetes mellitus. 2. Blood work recommended a. Total Immunoglobulins b. Total IgE. c TAMEKA and TAMEKA Titer. d. anti-DNA- DS. e. CH 50, C2,3,4. f. C1q. g. c1 esterase inhibitor assay. h. T3, T4, TSH Free T4 3. No NSAIDS at this time. 4. Will follow

## 2018-08-05 ENCOUNTER — TRANSCRIPTION ENCOUNTER (OUTPATIENT)
Age: 48
End: 2018-08-05

## 2018-08-05 VITALS
RESPIRATION RATE: 18 BRPM | TEMPERATURE: 98 F | OXYGEN SATURATION: 97 % | DIASTOLIC BLOOD PRESSURE: 89 MMHG | HEART RATE: 70 BPM | SYSTOLIC BLOOD PRESSURE: 155 MMHG

## 2018-08-05 LAB
ANION GAP SERPL CALC-SCNC: 13 MMOL/L — SIGNIFICANT CHANGE UP (ref 5–17)
BUN SERPL-MCNC: 12 MG/DL — SIGNIFICANT CHANGE UP (ref 8–20)
CALCIUM SERPL-MCNC: 8.7 MG/DL — SIGNIFICANT CHANGE UP (ref 8.6–10.2)
CHLORIDE SERPL-SCNC: 110 MMOL/L — HIGH (ref 98–107)
CO2 SERPL-SCNC: 20 MMOL/L — LOW (ref 22–29)
CREAT SERPL-MCNC: 0.53 MG/DL — SIGNIFICANT CHANGE UP (ref 0.5–1.3)
GLUCOSE SERPL-MCNC: 137 MG/DL — HIGH (ref 70–115)
HCT VFR BLD CALC: 32.6 % — LOW (ref 37–47)
HGB BLD-MCNC: 10.1 G/DL — LOW (ref 12–16)
MCHC RBC-ENTMCNC: 28.3 PG — SIGNIFICANT CHANGE UP (ref 27–31)
MCHC RBC-ENTMCNC: 31 G/DL — LOW (ref 32–36)
MCV RBC AUTO: 91.3 FL — SIGNIFICANT CHANGE UP (ref 81–99)
PLATELET # BLD AUTO: 330 K/UL — SIGNIFICANT CHANGE UP (ref 150–400)
POTASSIUM SERPL-MCNC: 4.2 MMOL/L — SIGNIFICANT CHANGE UP (ref 3.5–5.3)
POTASSIUM SERPL-SCNC: 4.2 MMOL/L — SIGNIFICANT CHANGE UP (ref 3.5–5.3)
RBC # BLD: 3.57 M/UL — LOW (ref 4.4–5.2)
RBC # FLD: 14.2 % — SIGNIFICANT CHANGE UP (ref 11–15.6)
SODIUM SERPL-SCNC: 143 MMOL/L — SIGNIFICANT CHANGE UP (ref 135–145)
T3 SERPL-MCNC: 90 NG/DL — SIGNIFICANT CHANGE UP (ref 80–200)
T4 AB SER-ACNC: 6.6 UG/DL — SIGNIFICANT CHANGE UP (ref 4.5–12)
TSH SERPL-MCNC: 0.63 UIU/ML — SIGNIFICANT CHANGE UP (ref 0.27–4.2)
WBC # BLD: 15.8 K/UL — HIGH (ref 4.8–10.8)
WBC # FLD AUTO: 15.8 K/UL — HIGH (ref 4.8–10.8)

## 2018-08-05 PROCEDURE — 99238 HOSP IP/OBS DSCHRG MGMT 30/<: CPT

## 2018-08-05 PROCEDURE — 85027 COMPLETE CBC AUTOMATED: CPT

## 2018-08-05 PROCEDURE — 84480 ASSAY TRIIODOTHYRONINE (T3): CPT

## 2018-08-05 PROCEDURE — 84439 ASSAY OF FREE THYROXINE: CPT

## 2018-08-05 PROCEDURE — 99285 EMERGENCY DEPT VISIT HI MDM: CPT | Mod: 25

## 2018-08-05 PROCEDURE — G0378: CPT

## 2018-08-05 PROCEDURE — 82785 ASSAY OF IGE: CPT

## 2018-08-05 PROCEDURE — 84443 ASSAY THYROID STIM HORMONE: CPT

## 2018-08-05 PROCEDURE — 86162 COMPLEMENT TOTAL (CH50): CPT

## 2018-08-05 PROCEDURE — 86225 DNA ANTIBODY NATIVE: CPT

## 2018-08-05 PROCEDURE — 36415 COLL VENOUS BLD VENIPUNCTURE: CPT

## 2018-08-05 PROCEDURE — 84436 ASSAY OF TOTAL THYROXINE: CPT

## 2018-08-05 PROCEDURE — 94640 AIRWAY INHALATION TREATMENT: CPT

## 2018-08-05 PROCEDURE — 80048 BASIC METABOLIC PNL TOTAL CA: CPT

## 2018-08-05 PROCEDURE — 96374 THER/PROPH/DIAG INJ IV PUSH: CPT

## 2018-08-05 PROCEDURE — 96375 TX/PRO/DX INJ NEW DRUG ADDON: CPT

## 2018-08-05 PROCEDURE — 80053 COMPREHEN METABOLIC PANEL: CPT

## 2018-08-05 PROCEDURE — 86160 COMPLEMENT ANTIGEN: CPT

## 2018-08-05 PROCEDURE — 96361 HYDRATE IV INFUSION ADD-ON: CPT

## 2018-08-05 PROCEDURE — 86038 ANTINUCLEAR ANTIBODIES: CPT

## 2018-08-05 RX ORDER — RIVAROXABAN 15 MG-20MG
0 KIT ORAL
Qty: 0 | Refills: 0 | COMMUNITY

## 2018-08-05 RX ORDER — TAMOXIFEN CITRATE 20 MG/1
1 TABLET, FILM COATED ORAL
Qty: 0 | Refills: 0 | COMMUNITY
Start: 2018-08-05

## 2018-08-05 RX ORDER — MELOXICAM 15 MG/1
0 TABLET ORAL
Qty: 0 | Refills: 0 | COMMUNITY

## 2018-08-05 RX ORDER — FAMOTIDINE 10 MG/ML
1 INJECTION INTRAVENOUS
Qty: 14 | Refills: 0 | OUTPATIENT
Start: 2018-08-05 | End: 2018-08-11

## 2018-08-05 RX ORDER — MONTELUKAST 4 MG/1
0 TABLET, CHEWABLE ORAL
Qty: 0 | Refills: 0 | COMMUNITY

## 2018-08-05 RX ADMIN — Medication 50 MILLIGRAM(S): at 11:31

## 2018-08-05 RX ADMIN — Medication 50 MILLIGRAM(S): at 04:23

## 2018-08-05 RX ADMIN — FAMOTIDINE 20 MILLIGRAM(S): 10 INJECTION INTRAVENOUS at 05:37

## 2018-08-05 RX ADMIN — RIVAROXABAN 20 MILLIGRAM(S): KIT at 11:31

## 2018-08-05 RX ADMIN — Medication 80 MILLIGRAM(S): at 05:37

## 2018-08-05 NOTE — DISCHARGE NOTE ADULT - SECONDARY DIAGNOSIS.
Allergic reaction to drug, initial encounter Malignant neoplasm of female breast, unspecified estrogen receptor status, unspecified laterality, unspecified site of breast

## 2018-08-05 NOTE — PROGRESS NOTE ADULT - SUBJECTIVE AND OBJECTIVE BOX
1. Tabitha is responding well to the medications. 2. Blood work was performed- discussed with hospitalist Dr. John Thayer. 3. Blood work included the following: a. TAMEKA Titer and TAMEKA b. anti-DNA-DS. c. Total Immunoglobulins IgG,A,M. d. Total IgE. e. T3,T4,TSH, Free T4. f. CH50,2,3,4, C1q,c-1 esterase inhibitor assay and functional assay. 4. Most likely diagnosis is angioedema of the lips and tongue with urticarial rash with pruritis secondary to NSAIDS (Meloxicam, Aspirin). Other possibilities to be determined. 5. Ok to discharge patient as discussed with Dr. Thayer, patient and family. Note the angioedema and urticarial rash have resolved. 6. Medications upon discharge: a. Benadryl 50 MG HS PRN q6h PO.b. Pepcid 20 MG BID PO.c. Prednisone 40MG PO QDAY for two days then 30 MG PO QDAYx1, 20 MG PO QDAYx1, 10MG PO x1. Then discontinue. d. No NSAIDS to be taken, ok to take Tylenol.

## 2018-08-05 NOTE — DISCHARGE NOTE ADULT - MEDICATION SUMMARY - MEDICATIONS TO TAKE
I will START or STAY ON the medications listed below when I get home from the hospital:    predniSONE 10 mg oral tablet  -- 3 tab(s) by mouth once a day (start and taper after Prednisone 40mg daily completed.    -- It is very important that you take or use this exactly as directed.  Do not skip doses or discontinue unless directed by your doctor.  Obtain medical advice before taking any non-prescription drugs as some may affect the action of this medication.  Take with food or milk.    -- Indication: For Angioedema    Xarelto  -- 1 tab(s) by mouth once a day  -- Indication: For Hip surgery    Neurontin 300 mg oral capsule  -- 1 cap(s) by mouth 3 times a day  -- Indication: For Neuropathy    Benadryl 50 mg oral capsule  -- 1 cap(s) by mouth every 6 hours  -- Indication: For Angioedema    tamoxifen 20 mg oral tablet  -- 1 tab(s) by mouth once a day (at bedtime)  -- Indication: For Breast Cancer    Pepcid 20 mg oral tablet  -- 1 tab(s) by mouth 2 times a day MDD:2 tablets  -- It is very important that you take or use this exactly as directed.  Do not skip doses or discontinue unless directed by your doctor.  Obtain medical advice before taking any non-prescription drugs as some may affect the action of this medication.    -- Indication: For Breast Cancer

## 2018-08-05 NOTE — DISCHARGE NOTE ADULT - HOSPITAL COURSE
Patient: SHARAD MARTIN [id number]                 Internal Medicine Hospitalist Discharge Note  HPI:  48y Female PMH Breast Ca on Tamoxifen, Neuropathy, Asthma (well controlled), s/p L hip THR 7/10/18  presents c/o tongue swelling and throat closing up, lower abdominal rash and itching x 2 days.  Denies any changes to diet, nor medications.  Denies environmental exposure / insect bites.  Symptoms began 2 days ago, approximately 30 minutes after taking ASA for DVT prophylaxis.  Was seen at [facility] and given steroids and benadryl with significant improvement.  ASA was stopped at that time and pt started on Xarelto.  Her symptoms recurred today.  Some relief after IV steroids and benadryl.  Denies SOB / dysphagia.  Family reports some hoarseness of her voice earlier.  No additional complaints.   Symptoms have resolved.  Seen by allergy.  Denies SOB / tongue swelling / rash.     Allergies    aspirin (Hives)  Contac 12 Hour Allergy (Angioedema)  Levaquin (Rash)  penicillin (Rash)    Intolerances (04 Aug 2018 17:56)    ____________________PHYSICAL EXAM:  Vitals reviewed as indicated below  GENERAL:  NAD Alert and Oriented x 3     HEENT: NCAT    CARDIOVASCULAR:  S1, S2    LUNGS: CTAB    ABDOMEN:  soft, (-) tenderness, (-) distension, (+) bowel sounds, (-) guarding, (-) rebound (-) rigidity    EXTREMITIES:  no cyanosis / clubbing / edema.   ____________________      VITALS:  Vital Signs Last 24 Hrs  T(C): 37 (05 Aug 2018 07:54), Max: 37.4 (04 Aug 2018 12:18)  T(F): 98.6 (05 Aug 2018 07:54), Max: 99.4 (04 Aug 2018 12:18)  HR: 73 (05 Aug 2018 07:54) (68 - 82)  BP: 113/63 (05 Aug 2018 07:54) (103/58 - 131/69)  BP(mean): --  RR: 18 (05 Aug 2018 07:54) (16 - 20)  SpO2: 98% (05 Aug 2018 07:54) (97% - 98%)   Daily Height in cm: 170.18 (04 Aug 2018 12:18)    Daily     CAPILLARY BLOOD GLUCOSE          I&O's Summary        LABS:                        10.1   15.8  )-----------( 330      ( 05 Aug 2018 07:20 )             32.6       08-05    143  |  110<H>  |  12.0  ----------------------------<  137<H>  4.2   |  20.0<L>  |  0.53    Ca    8.7      05 Aug 2018 07:20    TPro  7.8  /  Alb  3.8  /  TBili  <0.2<L>  /  DBili  x   /  AST  24  /  ALT  17  /  AlkPhos  75  08-04           LIVER FUNCTIONS - ( 04 Aug 2018 17:26 )  Alb: 3.8 g/dL / Pro: 7.8 g/dL / ALK PHOS: 75 U/L / ALT: 17 U/L / AST: 24 U/L / GGT: x                     > Angioedema - appears to be resolved.  Pt wishing for d/c home.  Discussed outpatient medication regimen including steroids, H2 blocker, benadryl.  Allergy followup.  Symptoms attributed to Medication Allergy to ASA.    > Leukocytosis - likely due to steroids.  > H/o Breast Cancer - continue Tamoxifen  > S/p L hip surgery - DVT Proph with Xarelto per Ortho.  > Asthma - well controlled.  Nebs prn.   > Neuropathy - resume Neurontin       Disposition: stable for discharge.  Outpatient followup as above.  Patient was advised to return if they experience any recurrence or worsening of symptoms.    -Shay Thayer D.O., Hospitalist, [facility]

## 2018-08-05 NOTE — DISCHARGE NOTE ADULT - PLAN OF CARE
stable for discharge It is important to see your primary physician as well as the physicians noted below within the next week to perform a comprehensive medical review.  Call their offices for an appointment as soon as you leave the hospital.  If you do not have a primary physician, contact the [facility] [phone] located on [address] [place] [address].  Your medical issues appear to be stable at this time, but if your symptoms recur or worsen, contact your physicians and/or return to the hospital if necessary.  If you encounter any issues or questions with your medication, call your physicians before stopping the medication.  Do not drive.

## 2018-08-05 NOTE — DISCHARGE NOTE ADULT - CARE PROVIDER_API CALL
Shukri Cueva), Medicine Pediatrics  [address] [place] [address]  Phone: [phone]  Fax: [phone]

## 2018-08-05 NOTE — DISCHARGE NOTE ADULT - PATIENT PORTAL LINK FT
You can access the [facility] Patient Portal, offered by [facility], by registering with the following website: [email or web address]

## 2018-08-05 NOTE — DISCHARGE NOTE ADULT - CARE PLAN
Principal Discharge DX:	Angioedema, initial encounter  Goal:	stable for discharge  Assessment and plan of treatment:	It is important to see your primary physician as well as the physicians noted below within the next week to perform a comprehensive medical review.  Call their offices for an appointment as soon as you leave the hospital.  If you do not have a primary physician, contact the [facility] [place] [phone] located on [address] [place] [address].  Your medical issues appear to be stable at this time, but if your symptoms recur or worsen, contact your physicians and/or return to the hospital if necessary.  If you encounter any issues or questions with your medication, call your physicians before stopping the medication.  Do not drive.  Secondary Diagnosis:	Allergic reaction to drug, initial encounter  Secondary Diagnosis:	Malignant neoplasm of female breast, unspecified estrogen receptor status, unspecified laterality, unspecified site of breast

## 2018-08-06 LAB
IGE SERPL-ACNC: 35 IU/ML — SIGNIFICANT CHANGE UP (ref 0–100)
T4 FREE SERPL-MCNC: 1 NG/DL — SIGNIFICANT CHANGE UP (ref 0.9–1.8)

## 2018-08-06 NOTE — DISCUSSION/SUMMARY
[Home] : patient was discharged to home [Follow Up Appt with Provider within 7 days] : follow up appt with provider within 7 days

## 2018-08-07 LAB
C3 SERPL-MCNC: 77 MG/DL — LOW (ref 81–157)
C4 SERPL-MCNC: 15 MG/DL — SIGNIFICANT CHANGE UP (ref 13–39)
DSDNA AB SER-ACNC: <12 IU/ML — SIGNIFICANT CHANGE UP
TOTAL HEM COMP BLD-ACNC: 27 U/ML — LOW (ref 42–95)

## 2018-08-08 ENCOUNTER — APPOINTMENT (OUTPATIENT)
Dept: FAMILY MEDICINE | Facility: CLINIC | Age: 48
End: 2018-08-08
Payer: COMMERCIAL

## 2018-08-08 VITALS
HEIGHT: 67 IN | WEIGHT: 215 LBS | BODY MASS INDEX: 33.74 KG/M2 | HEART RATE: 86 BPM | SYSTOLIC BLOOD PRESSURE: 122 MMHG | DIASTOLIC BLOOD PRESSURE: 72 MMHG

## 2018-08-08 DIAGNOSIS — Z09 ENCOUNTER FOR FOLLOW-UP EXAMINATION AFTER COMPLETED TREATMENT FOR CONDITIONS OTHER THAN MALIGNANT NEOPLASM: ICD-10-CM

## 2018-08-08 DIAGNOSIS — D64.9 ANEMIA, UNSPECIFIED: ICD-10-CM

## 2018-08-08 LAB
C1INH FUNCTIONAL/C1INH TOTAL MFR SERPL: 23 MG/DL — SIGNIFICANT CHANGE UP (ref 21–39)
C1Q AG SERPL RAJI CELL-ACNC: 21 MG/DL — SIGNIFICANT CHANGE UP (ref 12–22)

## 2018-08-08 PROCEDURE — 99495 TRANSJ CARE MGMT MOD F2F 14D: CPT

## 2018-08-08 RX ORDER — RANITIDINE HYDROCHLORIDE 150 MG/1
150 CAPSULE ORAL
Refills: 0 | Status: DISCONTINUED | COMMUNITY
End: 2018-08-08

## 2018-08-08 RX ORDER — ASPIRIN 325 MG/1
325 TABLET, FILM COATED ORAL
Refills: 0 | Status: DISCONTINUED | COMMUNITY
End: 2018-08-08

## 2018-08-08 RX ORDER — PANTOPRAZOLE 40 MG/1
40 TABLET, DELAYED RELEASE ORAL
Qty: 90 | Refills: 1 | Status: DISCONTINUED | COMMUNITY
Start: 2018-07-31 | End: 2018-08-08

## 2018-08-08 RX ORDER — MELOXICAM 15 MG/1
15 TABLET ORAL
Qty: 90 | Refills: 0 | Status: DISCONTINUED | COMMUNITY
End: 2018-08-08

## 2018-08-09 ENCOUNTER — EMERGENCY (EMERGENCY)
Facility: HOSPITAL | Age: 48
LOS: 1 days | Discharge: HOME | End: 2018-08-09
Attending: EMERGENCY MEDICINE
Payer: COMMERCIAL

## 2018-08-09 VITALS
OXYGEN SATURATION: 97 % | DIASTOLIC BLOOD PRESSURE: 78 MMHG | SYSTOLIC BLOOD PRESSURE: 135 MMHG | RESPIRATION RATE: 18 BRPM | HEART RATE: 78 BPM

## 2018-08-09 VITALS — HEIGHT: 67 IN | WEIGHT: 214.95 LBS

## 2018-08-09 DIAGNOSIS — Z90.13 ACQUIRED ABSENCE OF BILATERAL BREASTS AND NIPPLES: Chronic | ICD-10-CM

## 2018-08-09 DIAGNOSIS — Z90.5 ACQUIRED ABSENCE OF KIDNEY: Chronic | ICD-10-CM

## 2018-08-09 DIAGNOSIS — D30.02 BENIGN NEOPLASM OF LEFT KIDNEY: Chronic | ICD-10-CM

## 2018-08-09 DIAGNOSIS — Z98.890 OTHER SPECIFIED POSTPROCEDURAL STATES: Chronic | ICD-10-CM

## 2018-08-09 LAB
ANA PAT FLD IF-IMP: ABNORMAL
ANA TITR SER: ABNORMAL

## 2018-08-09 PROCEDURE — 99283 EMERGENCY DEPT VISIT LOW MDM: CPT

## 2018-08-09 RX ORDER — DIPHENHYDRAMINE HCL 50 MG
1 CAPSULE ORAL
Qty: 0 | Refills: 0 | COMMUNITY

## 2018-08-09 RX ORDER — GABAPENTIN 400 MG/1
1 CAPSULE ORAL
Qty: 0 | Refills: 0 | COMMUNITY

## 2018-08-09 RX ORDER — DEXAMETHASONE 0.5 MG/5ML
10 ELIXIR ORAL ONCE
Qty: 0 | Refills: 0 | Status: COMPLETED | OUTPATIENT
Start: 2018-08-09 | End: 2018-08-09

## 2018-08-09 RX ORDER — RIVAROXABAN 15 MG-20MG
1 KIT ORAL
Qty: 0 | Refills: 0 | COMMUNITY

## 2018-08-09 RX ORDER — HYDROXYZINE HCL 10 MG
0 TABLET ORAL
Qty: 0 | Refills: 0 | COMMUNITY

## 2018-08-09 RX ADMIN — Medication 10 MILLIGRAM(S): at 15:54

## 2018-08-09 NOTE — ED ADULT NURSE REASSESSMENT NOTE - NS ED NURSE REASSESS COMMENT FT1
patient awake, alert, ambulating without difficulty. awaiting MD. patient in no apparent distress. will continue to monitor. patient awake, alert, ambulating without difficulty. awaiting MD. Cueva. patient in no apparent distress. will continue to monitor.

## 2018-08-09 NOTE — ED PROVIDER NOTE - PROGRESS NOTE DETAILS
Spoke with Dr. Cueva, about pt. He wishes to see pt in evening hours at 18:30 pts request to be seen by Dr Cueva prior to leaving the ED - at first requested consults from ENT/GI as well as scope in the department - pt then came back and says she doesn't want IV or other consultants, wants to wait for Dr Melissa rodrigez, refuses blood work at this time as well Dr Cueva called back from the ED to see if its possible to see pt prior to office hours. she is awake ambulatory, able to tolerate liquids handles her own secretions and phonation is clear

## 2018-08-09 NOTE — ED ADULT NURSE REASSESSMENT NOTE - NS ED NURSE REASSESS COMMENT FT1
MD. Cueva called, wanted to speak to doctor, informed that there is no MD. assigned yet. took number to call back.

## 2018-08-09 NOTE — ED PROVIDER NOTE - MEDICAL DECISION MAKING DETAILS
47 y/o with hx of allergic rx, present with reoccurrence of allergic rx, will give PO decadron and have pt f/u with Dr. Hammad lott.

## 2018-08-09 NOTE — ED ADULT NURSE REASSESSMENT NOTE - NS ED NURSE REASSESS COMMENT FT1
Patient ambulating on B side. Reports hives and bumps on her tongue. Patient tongue not swollen; no uvula swelling/deviation. Patient spo2 99% on room air. Awaiting MD for eval. Patient stable.

## 2018-08-09 NOTE — ED ADULT NURSE NOTE - NSIMPLEMENTINTERV_GEN_ALL_ED
Implemented All Universal Safety Interventions:  [place] to call system. Call bell, personal items and telephone within reach. Instruct patient to call for assistance. Room bathroom lighting operational. Non-slip footwear when patient is off stretcher. Physically safe environment: no spills, clutter or unnecessary equipment. Stretcher in lowest position, wheels locked, appropriate side rails in place.

## 2018-08-09 NOTE — ED ADULT NURSE NOTE - OBJECTIVE STATEMENT
48 year old patient on sunday, treated with Hammad, on  took prednisone this am 40 on taper. / lower down patient feels like its getting stuck, hives, thighs. trunks for over a week, off and on, swollen lower lip noted. patient states she has facial numbness,

## 2018-08-09 NOTE — ED PROVIDER NOTE - OBJECTIVE STATEMENT
47 y/o F pt with hx of 48 breast CA, peripheral neuropathy, previous allergic reactions presents to ED c/o allergic reaction since today. The pt states she has had similar symptoms in the past for which she was admitted to [facility], they gave her prednisone and sent her home for outpatient follow up. The pt followed up with Dr. Cueva an allergist who put her on that relieved her symptoms. She presented to [facility] on Saturday with reoccurrence of allergic rx, she f/u with Dr. Cueva at the time. She states he ad sudden onset of a rash of her b/l UE's and LE's as well as her back with hives and itching sensation. She noted difficulty swallowing with solid and liquid foods at the time. Denies N/V/D, fever, chills, SOB, CP, difficulty breathing, or abd pain. Denies N/V/D, fever, chills, SOB, CP, difficulty breathing, HA, diaphoresis, leg swelling, blurry vision or abd pain. 47 y/o F pt with hx of 49 breast CA, peripheral neuropathy, previous allergic reactions presents to ED c/o allergic reaction since today. The pt states she has had similar symptoms in the past for which she was admitted to [facility], they gave her prednisone and sent her home for outpatient follow up. The pt followed up with Dr. Cueva an allergist who put her on prednisone that relieved her symptoms. She presented to [facility] on Saturday with reoccurrence of allergic rx, she f/u with Dr. Cueva at the time. She states he ad sudden onset of a rash of her b/l UE's and LE's as well as her back with hives and itching sensation. She noted difficulty swallowing with solid and liquid foods at the time. Denies N/V/D, fever, chills, SOB, CP, difficulty breathing, or abd pain. Denies N/V/D, fever, chills, SOB, CP, difficulty breathing, HA, diaphoresis, leg swelling, blurry vision or abd pain.

## 2018-08-09 NOTE — ED PROVIDER NOTE - ENMT, MLM
no edema but slight erythema back of the throat with pontine lesion apprx 0.2 cm right raised pharyngeal tonsil. oropharynx and uvula non edematous, gag reflex in tact. tolerated PO today in ED.

## 2018-08-09 NOTE — ED ADULT TRIAGE NOTE - CHIEF COMPLAINT QUOTE
biba c/o tongue swelling, states restarted gabapentin recently and thinks its associated with that, recently admitted for similar symptoms, airway patent

## 2018-08-13 ENCOUNTER — APPOINTMENT (OUTPATIENT)
Dept: FAMILY MEDICINE | Facility: CLINIC | Age: 48
End: 2018-08-13
Payer: COMMERCIAL

## 2018-08-13 VITALS
WEIGHT: 215 LBS | OXYGEN SATURATION: 99 % | HEART RATE: 77 BPM | BODY MASS INDEX: 33.74 KG/M2 | HEIGHT: 67 IN | DIASTOLIC BLOOD PRESSURE: 70 MMHG | SYSTOLIC BLOOD PRESSURE: 129 MMHG

## 2018-08-13 LAB — C2 FLD-MCNC: 2.3 MG/DL — SIGNIFICANT CHANGE UP (ref 1.6–4)

## 2018-08-13 PROCEDURE — 99213 OFFICE O/P EST LOW 20 MIN: CPT

## 2018-08-13 RX ORDER — PREDNISONE 50 MG/1
TABLET ORAL
Refills: 0 | Status: DISCONTINUED | COMMUNITY
End: 2018-08-13

## 2018-08-19 PROBLEM — D64.9 ANEMIA: Status: ACTIVE | Noted: 2018-08-08

## 2018-08-19 PROBLEM — Z09 HOSPITAL DISCHARGE FOLLOW-UP: Status: ACTIVE | Noted: 2018-08-19

## 2018-08-19 NOTE — PHYSICAL EXAM
[No Acute Distress] : no acute distress [Well Nourished] : well nourished [Well-Appearing] : well-appearing [No Respiratory Distress] : no respiratory distress  [Clear to Auscultation] : lungs were clear to auscultation bilaterally [No Accessory Muscle Use] : no accessory muscle use [Normal Rate] : normal rate  [Regular Rhythm] : with a regular rhythm [Normal S1, S2] : normal S1 and S2 [Acne] : no acne [de-identified] : RASH GREATLY IMPROVED - ISOLATED RESIDUAL HIVE ON UPPER BACK, ARM

## 2018-08-19 NOTE — PHYSICAL EXAM
[No Acute Distress] : no acute distress [Well Nourished] : well nourished [Well-Appearing] : well-appearing [Normal Sclera/Conjunctiva] : normal sclera/conjunctiva [PERRL] : pupils equal round and reactive to light [Normal Outer Ear/Nose] : the outer ears and nose were normal in appearance [Normal Oropharynx] : the oropharynx was normal [Supple] : supple [No Lymphadenopathy] : no lymphadenopathy [No Respiratory Distress] : no respiratory distress  [Clear to Auscultation] : lungs were clear to auscultation bilaterally [No Accessory Muscle Use] : no accessory muscle use [Normal Rate] : normal rate  [Regular Rhythm] : with a regular rhythm [Normal S1, S2] : normal S1 and S2 [Soft] : abdomen soft [Non Tender] : non-tender [Normal Bowel Sounds] : normal bowel sounds [No Joint Swelling] : no joint swelling [Grossly Normal Strength/Tone] : grossly normal strength/tone [Coordination Grossly Intact] : coordination grossly intact [No Focal Deficits] : no focal deficits [Speech Grossly Normal] : speech grossly normal [Normal Mood] : the mood was normal [Normal Insight/Judgement] : insight and judgment were intact [Acne] : no acne [de-identified] : NO FACIAL SWELLING PRESENT

## 2018-08-19 NOTE — PLAN
[FreeTextEntry1] : HOSPITAL DISCHARGE DOCUMENTS REVIEWED\par AVOIDANCE OF ASPIRIN AND NSAIDS\par HAS EPIPEN PRN\par CONTINUE DISCHARGE MEDICATIONS - MEDICATION LIST REVIEWED\par CLOSE FOLLOW-UP WITH ALLERGIST\par TO ER WITH ANY CONCERNS\par ADVISED F/U MSK\par FOLLOW-UP ONE MONTH FOR REPEAT LAB WORK INCLUDING CBC\par CHECK STOOL OCCULT\par CALL WITH ANY QUESTIONS, CONCERNS OR CHANGES

## 2018-08-19 NOTE — HISTORY OF PRESENT ILLNESS
[FreeTextEntry1] : F/U ALLERGIC REACTION [de-identified] : PRESENTING FOR FOLLOW-UP.  HISTORY OF ALLERGIC REACTION.  SEEING ALLERGIST DR. ANTHONY.  NOW OFF PREDNISONE AND ON DEXAMETHASONE.  ON PEPCID AND HYDROXYZINE.  RASH DISSIPATING.  NO SWELLING.  NO TROUBLE SWALLOWING.  NO LIP SWELLING. TO FOLLOW-UP AGAIN TOMORROW NIGHT WITH ALLERGIST WHO THINKS REACTION IS FROM NSAIDS FROM HOSPITAL.  WILL ALSO SOMETIMES GET JOINT PAINS.  THEY ALSO DISCUSSED POSSIBLE AUTOIMMUNE ETIOLOGY AND TO SEE HER ONCOLOGIST.  PRIOR RHEUMATOLOGY EVALUATION IN JUNE - WOULD LIKE 2ND OPINION.  DR. GILL.  \par

## 2018-08-19 NOTE — REVIEW OF SYSTEMS
[Itching] : Itching [Skin Rash] : skin rash [Fever] : no fever [Chills] : no chills [Discharge] : no discharge [Pain] : no pain [Earache] : no earache [Nasal Discharge] : no nasal discharge [Sore Throat] : no sore throat [Chest Pain] : no chest pain [Palpitations] : no palpitations [Shortness Of Breath] : no shortness of breath [Wheezing] : no wheezing [Cough] : no cough [Abdominal Pain] : no abdominal pain [Nausea] : no nausea [Diarrhea] : diarrhea [Vomiting] : no vomiting [Dysuria] : no dysuria [Hematuria] : no hematuria [Joint Pain] : no joint pain [Muscle Pain] : no muscle pain [Headache] : no headache [Dizziness] : no dizziness [Fainting] : no fainting [Easy Bleeding] : no easy bleeding [Easy Bruising] : no easy bruising

## 2018-08-19 NOTE — REVIEW OF SYSTEMS
[Joint Pain] : joint pain [Itching] : Itching [Skin Rash] : skin rash [Fever] : no fever [Fatigue] : no fatigue [Chest Pain] : no chest pain [Palpitations] : no palpitations [Shortness Of Breath] : no shortness of breath [Cough] : no cough [Muscle Pain] : no muscle pain

## 2018-08-19 NOTE — PLAN
[FreeTextEntry1] : RHEUMATOLOGY FOR 2ND OPINION AND ONCOLOGY FOLLOW-UP AS RECOMMENDED BY ALLERGIST\par CONTINUE MEDICATIONS PRESCRIBED\par TO ER WITH ANY CONCERNS OR WORSENING\par F/U ONE MONTH FOR REPEAT LAB WORK\par CALL WITH ANY QUESTIONS, CONCERNS OR CHANGES

## 2018-08-19 NOTE — HISTORY OF PRESENT ILLNESS
[FreeTextEntry1] : F/U FROM HOSPITAL [de-identified] : PRESENTING FOR FOLLOW-UP FROM [facility]\par ADMITTED: AUGUST 4, 2018 - AUGUST 5, 2018\par SEES NICHOLE TELEPHONE LOG IN CHART\par \par MS. MARTIN IS A VERY PLEASANT 47YO WITH A HISTORY OF BREAST CANCER ON TAMOXIFEN, NEUROPATHY, ASTHMA WHO IS S/P LEFT HIP REPLACEMENT ON JULY 10, 2018.  SHE PRESENTED TO THE ER WITH TONGUE SWELLING AND FELT LIKE HER THROAT WAS CLOSING UP AS WELL AS HIVES AND ITCHING ON LOWER ABDOMEN.  HAS HAD NO KNOWN ENVIRONMENTAL EXPOSURE OR NEW FOODS, LOTIONS, ETC.  STARTED AFTER TAKING ASPIRIN FOR DVT PROPHYLAXIS POST REPLACEMENT.  WAS INITIALLY SEEN AT [facility] AND GIVEN BENADRYL AND STEROIDS WITH IMPROVEMENT.  ASPIRIN WAS STOPPED AND STARTED ON XARELTO.  AS A RESULT OF SYMPTOMS RECURRING, SHE WENT TO [place] [facility] FOR EVALUATION AND THIS ADMISSION.  WAS AGAIN GIVEN IV STEROIDS AND BENADRYL.  SEEN BY ALLERGIST AND SYMPTOMS RESOLVED.  DISCHARGED HOME ON STEROIDS, H2 BLOCKER AND BENADRYL AND ADVISED TO FOLLOW-UP WITH ALLERGIST.  SAW ALLERGIST DR. ANTHONY ON MONDAY.  HAD ADDITIONAL STEROID SHOT.  PREDNISONE INCREASED TO 60 MG AND TAPERING.  TO F/U AGAIN ON FRIDAY.  HOLDING ASPIRIN AND MELOXICAM.  SWITCHED TO XARELTO 10 MG DAILY.  GIVEN HYDROXYZINE 25 MG ONE TO TWO TABLETS.  SYMPTOMS IMPROVED.  A COUPLE SPORADIC HIVES ON ARMS.  GIVEN EPIPEN AND HYDROCORTISONE CREAM.  HAD ALLERGY TESTING TODAY.  SAW GI AND TO HAVE ENDOSCOPY IN OCTOBER.  ANEMIA ON LABS IN ER.  NO SIGNS OR SYMPTOMS OF BLEEDING.

## 2018-08-28 ENCOUNTER — CLINICAL ADVICE (OUTPATIENT)
Age: 48
End: 2018-08-28

## 2018-09-14 ENCOUNTER — APPOINTMENT (OUTPATIENT)
Dept: FAMILY MEDICINE | Facility: CLINIC | Age: 48
End: 2018-09-14
Payer: COMMERCIAL

## 2018-09-14 VITALS
DIASTOLIC BLOOD PRESSURE: 72 MMHG | HEIGHT: 67 IN | TEMPERATURE: 99.4 F | WEIGHT: 225 LBS | BODY MASS INDEX: 35.31 KG/M2 | HEART RATE: 88 BPM | SYSTOLIC BLOOD PRESSURE: 128 MMHG

## 2018-09-14 PROCEDURE — 99213 OFFICE O/P EST LOW 20 MIN: CPT

## 2018-09-14 RX ORDER — HYDROMORPHONE HYDROCHLORIDE 2 MG/1
2 TABLET ORAL
Refills: 0 | Status: DISCONTINUED | COMMUNITY
End: 2018-09-14

## 2018-09-14 RX ORDER — DEXAMETHASONE 0.75 MG/.75MG
0.75 TABLET ORAL
Qty: 40 | Refills: 0 | Status: DISCONTINUED | COMMUNITY
Start: 2018-08-09 | End: 2018-09-14

## 2018-09-14 RX ORDER — RIVAROXABAN 10 MG/1
10 TABLET, FILM COATED ORAL
Qty: 90 | Refills: 0 | Status: DISCONTINUED | COMMUNITY
End: 2018-09-14

## 2018-09-15 NOTE — PLAN
[FreeTextEntry1] : STILL NEEDS TO GO FOR LAB WORK\par HAS STOOL KIT\par NEED ALLERGIST CONSULTANT NOTES\par TO FOLLOW-UP ALL SPECIALISTS AS DIRECTED AND HAVE CONSULTANT NOTES AND TESTING FORWARDED FOR REVIEW\par SUPPORT GIVEN\par AWARE WHEN TO SEEK EMERGENT CARE\par WANTS TO WAIT ON FLU VACCINE AND HAVE WITH ALLERGIST\par CALL WITH ANY QUESTIONS, CONCERNS OR CHANGES

## 2018-09-15 NOTE — HISTORY OF PRESENT ILLNESS
[de-identified] : PRESENTING FOR FOLLOW-UP.  SAW ALLERGIST DR. ALLEN [facility] ALLERGY FOR SECOND OPINION.  WAS ADVISED HIVES WERE LIKELY SECONDARY TO AN AUTOIMMUNE REACTION.  ON XYZAL, HYDROXYZINE, PEPCID.  STILL WILL GET HIVES ON AND OFF.  HAS NO TROUBLE SWALLOWING OR BREATHING.  IS TO F/U AGAIN ON THE 20TH.  TO SEE RHEUMATOLOGY ON TUESDAY DR. NAGA VALENZUELA.  TO SEE GI DR. MCCARTHY FOR ROUTINE F/U.  BLOOD WORK DONE AT [employer] [facility] WITH ALLERGIST.  HAS NOT GONE FOR BLOOD WORK FROM OUR OFFICE YET.  SEES ONCOLOGIST ROUTINELY.  NO OTHER NEW COMPLAINTS TODAY.

## 2018-09-15 NOTE — REVIEW OF SYSTEMS
[Itching] : Itching [Fever] : no fever [Chills] : no chills [Chest Pain] : no chest pain [Palpitations] : no palpitations [Shortness Of Breath] : no shortness of breath [Cough] : no cough [Mole Changes] : no mole changes

## 2018-09-15 NOTE — PHYSICAL EXAM
[No Acute Distress] : no acute distress [Well Nourished] : well nourished [Well-Appearing] : well-appearing [No Respiratory Distress] : no respiratory distress  [Clear to Auscultation] : lungs were clear to auscultation bilaterally [No Accessory Muscle Use] : no accessory muscle use [Normal Rate] : normal rate  [Regular Rhythm] : with a regular rhythm [Normal S1, S2] : normal S1 and S2 [Acne] : no acne [de-identified] : SPORADIC ISOLATED HIVES ON FOREARMS, NO EDEMA

## 2018-09-24 ENCOUNTER — APPOINTMENT (OUTPATIENT)
Dept: PEDIATRIC ALLERGY IMMUNOLOGY | Facility: CLINIC | Age: 48
End: 2018-09-24
Payer: COMMERCIAL

## 2018-09-24 ENCOUNTER — LABORATORY RESULT (OUTPATIENT)
Age: 48
End: 2018-09-24

## 2018-09-24 VITALS
BODY MASS INDEX: 34.53 KG/M2 | WEIGHT: 220 LBS | SYSTOLIC BLOOD PRESSURE: 129 MMHG | OXYGEN SATURATION: 97 % | DIASTOLIC BLOOD PRESSURE: 78 MMHG | HEIGHT: 67 IN | HEART RATE: 78 BPM

## 2018-09-24 PROCEDURE — 99245 OFF/OP CONSLTJ NEW/EST HI 55: CPT | Mod: 25,GC

## 2018-09-24 PROCEDURE — 36415 COLL VENOUS BLD VENIPUNCTURE: CPT | Mod: GC

## 2018-09-25 LAB
25(OH)D3 SERPL-MCNC: 35 NG/ML
ALBUMIN SERPL ELPH-MCNC: 4.2 G/DL
ALP BLD-CCNC: 62 U/L
ALT SERPL-CCNC: 16 U/L
ANION GAP SERPL CALC-SCNC: 18 MMOL/L
AST SERPL-CCNC: 15 U/L
BASOPHILS # BLD AUTO: 0.04 K/UL
BASOPHILS NFR BLD AUTO: 0.4 %
BILIRUB SERPL-MCNC: <0.2 MG/DL
BUN SERPL-MCNC: 14 MG/DL
CALCIUM SERPL-MCNC: 9.6 MG/DL
CHLORIDE SERPL-SCNC: 106 MMOL/L
CO2 SERPL-SCNC: 24 MMOL/L
CREAT SERPL-MCNC: 0.81 MG/DL
CRP SERPL-MCNC: 1.14 MG/DL
EOSINOPHIL # BLD AUTO: 0.22 K/UL
EOSINOPHIL NFR BLD AUTO: 2.2 %
ERYTHROCYTE [SEDIMENTATION RATE] IN BLOOD BY WESTERGREN METHOD: 31 MM/HR
GLUCOSE SERPL-MCNC: 87 MG/DL
HCT VFR BLD CALC: 39.6 %
HGB BLD-MCNC: 12.6 G/DL
IMM GRANULOCYTES NFR BLD AUTO: 0.4 %
LYMPHOCYTES # BLD AUTO: 3.27 K/UL
LYMPHOCYTES NFR BLD AUTO: 32 %
MAN DIFF?: NORMAL
MCHC RBC-ENTMCNC: 29 PG
MCHC RBC-ENTMCNC: 31.8 GM/DL
MCV RBC AUTO: 91 FL
MONOCYTES # BLD AUTO: 0.62 K/UL
MONOCYTES NFR BLD AUTO: 6.1 %
NEUTROPHILS # BLD AUTO: 6.04 K/UL
NEUTROPHILS NFR BLD AUTO: 58.9 %
PLATELET # BLD AUTO: 276 K/UL
POTASSIUM SERPL-SCNC: 5.4 MMOL/L
PROT SERPL-MCNC: 7.5 G/DL
RBC # BLD: 4.35 M/UL
RBC # FLD: 14.9 %
SODIUM SERPL-SCNC: 148 MMOL/L
THYROGLOB AB SERPL-ACNC: <20 IU/ML
THYROPEROXIDASE AB SERPL IA-ACNC: <10 IU/ML
WBC # FLD AUTO: 10.23 K/UL

## 2018-09-26 LAB
A ALTERNATA IGE QN: <0.1 KUA/L
A FUMIGATUS IGE QN: <0.1 KUA/L
AMER BEECH IGE QN: 0
BOXELDER IGE QN: <0.1 KUA/L
C HERBARUM IGE QN: <0.1 KUA/L
C LUNATA IGE QN: <0.1 KUA/L
CAT DANDER IGE QN: <0.1 KUA/L
CMN PIGWEED IGE QN: <0.1 KUA/L
COCKLEBUR IGE QN: <0.1 KUA/L
COCKSFOOT IGE QN: 1.45 KUA/L
COMMON RAGWEED IGE QN: <0.1 KUA/L
COTTONWOOD IGE QN: <0.1 KUA/L
D FARINAE IGE QN: 0.14 KUA/L
D PTERONYSS IGE QN: <0.1 KUA/L
DEPRECATED A ALTERNATA IGE RAST QL: 0
DEPRECATED A FUMIGATUS IGE RAST QL: 0
DEPRECATED A PULLULANS IGE RAST QL: 0
DEPRECATED AMER BEECH IGE RAST QL: <0.1 KUA/L
DEPRECATED BOXELDER IGE RAST QL: 0
DEPRECATED C HERBARUM IGE RAST QL: 0
DEPRECATED C LUNATA IGE RAST QL: 0
DEPRECATED CAT DANDER IGE RAST QL: 0
DEPRECATED COCKLEBUR IGE RAST QL: 0
DEPRECATED COCKSFOOT IGE RAST QL: ABNORMAL
DEPRECATED COMMON PIGWEED IGE RAST QL: 0
DEPRECATED COMMON RAGWEED IGE RAST QL: 0
DEPRECATED COTTONWOOD IGE RAST QL: 0
DEPRECATED D FARINAE IGE RAST QL: NORMAL
DEPRECATED D PTERONYSS IGE RAST QL: 0
DEPRECATED DOG DANDER IGE RAST QL: 0
DEPRECATED ENGL PLANTAIN IGE RAST QL: 0
DEPRECATED F MONILIFORME IGE RAST QL: 0
DEPRECATED GIANT RAGWEED IGE RAST QL: 0
DEPRECATED GOOSE FEATHER IGE RAST QL: 0
DEPRECATED GOOSEFOOT IGE RAST QL: 0
DEPRECATED KENT BLUE GRASS IGE RAST QL: ABNORMAL
DEPRECATED LONDON PLANE IGE RAST QL: 0
DEPRECATED M RACEMOSUS IGE RAST QL: 0
DEPRECATED MUGWORT IGE RAST QL: 0
DEPRECATED P NOTATUM IGE RAST QL: 0
DEPRECATED R NIGRICANS IGE RAST QL: 0
DEPRECATED RED CEDAR IGE RAST QL: 0
DEPRECATED RED TOP GRASS IGE RAST QL: ABNORMAL
DEPRECATED ROACH IGE RAST QL: 0
DEPRECATED RYE IGE RAST QL: ABNORMAL
DEPRECATED SALTWORT IGE RAST QL: 0
DEPRECATED SILVER BIRCH IGE RAST QL: 0
DEPRECATED TIMOTHY IGE RAST QL: ABNORMAL
DEPRECATED WHITE ASH IGE RAST QL: 0
DEPRECATED WHITE HICKORY IGE RAST QL: 0
DEPRECATED WHITE OAK IGE RAST QL: 0
DOG DANDER IGE QN: <0.1 KUA/L
ENGL PLANTAIN IGE QN: <0.1 KUA/L
F MONILIFORME IGE QN: <0.1 KUA/L
G6PD SER-CCNC: 17.2 U/G HGB
GIANT RAGWEED IGE QN: <0.1 KUA/L
GOOSE FEATHER IGE QN: <0.1 KUA/L
GOOSEFOOT IGE QN: <0.1 KUA/L
GRAY ALDER (T2) CLASS: 0
GRAY ALDER (T2) CONC: <0.1 KUA/L
KENT BLUE GRASS IGE QN: 1.55 KUA/L
LONDON PLANE IGE QN: <0.1 KUA/L
M RACEMOSUS IGE QN: <0.1 KUA/L
MOLD (AUREOBASIDIUM M12) CONC: <0.1 KUA/L
MUGWORT IGE QN: <0.1 KUA/L
MULBERRY (T70) CLASS: 0
MULBERRY (T70) CONC: <0.1 KUA/L
P NOTATUM IGE QN: <0.1 KUA/L
R NIGRICANS IGE QN: <0.1 KUA/L
RED CEDAR IGE QN: <0.1 KUA/L
RED TOP GRASS IGE QN: 1.43 KUA/L
ROACH IGE QN: <0.1 KUA/L
RYE IGE QN: 1.52 KUA/L
SALTWORT IGE QN: <0.1 KUA/L
SILVER BIRCH IGE QN: <0.1 KUA/L
TIMOTHY IGE QN: 1.45 KUA/L
TRYPTASE: 3.2 NG/ML
WHITE ASH IGE QN: <0.1 KUA/L
WHITE ELM IGE QN: 0
WHITE ELM IGE QN: <0.1 KUA/L
WHITE HICKORY IGE QN: <0.1 KUA/L
WHITE OAK IGE QN: <0.1 KUA/L

## 2018-09-27 LAB
C1INH FUNCTIONAL FLD-MCNC: 79
C1Q SERPL-MCNC: 29 MG/DL

## 2018-09-28 LAB — C1INH SERPL-MCNC: 23 MG/DL

## 2018-10-04 LAB
IGE RECEPTOR AB: 5.2 %
IGE RECEPTOR COMMENT: NORMAL

## 2018-10-12 ENCOUNTER — TRANSCRIPTION ENCOUNTER (OUTPATIENT)
Age: 48
End: 2018-10-12

## 2018-10-15 ENCOUNTER — TRANSCRIPTION ENCOUNTER (OUTPATIENT)
Age: 48
End: 2018-10-15

## 2018-10-15 LAB
MISCELLANEOUS TEST: NORMAL
PROC NAME: NORMAL

## 2018-10-16 ENCOUNTER — APPOINTMENT (OUTPATIENT)
Dept: PEDIATRIC ALLERGY IMMUNOLOGY | Facility: CLINIC | Age: 48
End: 2018-10-16
Payer: COMMERCIAL

## 2018-10-16 LAB — 2,3-DINOR 11B-PROSTAGLANDIN F2A, 24 HR URINE: NORMAL

## 2018-10-17 ENCOUNTER — TRANSCRIPTION ENCOUNTER (OUTPATIENT)
Age: 48
End: 2018-10-17

## 2018-10-17 ENCOUNTER — RX RENEWAL (OUTPATIENT)
Age: 48
End: 2018-10-17

## 2018-10-22 ENCOUNTER — APPOINTMENT (OUTPATIENT)
Dept: PEDIATRIC ALLERGY IMMUNOLOGY | Facility: CLINIC | Age: 48
End: 2018-10-22
Payer: COMMERCIAL

## 2018-10-22 VITALS
BODY MASS INDEX: 36.73 KG/M2 | OXYGEN SATURATION: 98 % | WEIGHT: 234 LBS | HEART RATE: 87 BPM | DIASTOLIC BLOOD PRESSURE: 84 MMHG | SYSTOLIC BLOOD PRESSURE: 127 MMHG | HEIGHT: 67 IN

## 2018-10-22 PROCEDURE — 99212 OFFICE O/P EST SF 10 MIN: CPT | Mod: 25

## 2018-10-22 PROCEDURE — 96372 THER/PROPH/DIAG INJ SC/IM: CPT

## 2018-10-22 RX ORDER — HYDROCORTISONE 25 MG/G
2.5 OINTMENT TOPICAL
Qty: 1 | Refills: 1 | Status: ACTIVE | COMMUNITY
Start: 2018-10-22 | End: 1900-01-01

## 2018-10-22 RX ORDER — OMALIZUMAB 202.5 MG/1.4ML
150 INJECTION, SOLUTION SUBCUTANEOUS
Qty: 1 | Refills: 0 | Status: COMPLETED | OUTPATIENT
Start: 2018-10-22 | End: 1900-01-01

## 2018-10-22 RX ADMIN — OMALIZUMAB 0 MG: 202.5 INJECTION, SOLUTION SUBCUTANEOUS at 00:00

## 2018-10-24 ENCOUNTER — APPOINTMENT (OUTPATIENT)
Dept: RHEUMATOLOGY | Facility: CLINIC | Age: 48
End: 2018-10-24

## 2018-10-30 ENCOUNTER — APPOINTMENT (OUTPATIENT)
Dept: GASTROENTEROLOGY | Facility: GI CENTER | Age: 48
End: 2018-10-30

## 2018-10-31 ENCOUNTER — APPOINTMENT (OUTPATIENT)
Dept: PEDIATRIC ALLERGY IMMUNOLOGY | Facility: CLINIC | Age: 48
End: 2018-10-31
Payer: COMMERCIAL

## 2018-10-31 VITALS
DIASTOLIC BLOOD PRESSURE: 80 MMHG | WEIGHT: 231.6 LBS | SYSTOLIC BLOOD PRESSURE: 121 MMHG | BODY MASS INDEX: 36.27 KG/M2 | HEART RATE: 83 BPM

## 2018-10-31 DIAGNOSIS — T50.905A ADVERSE EFFECT OF UNSPECIFIED DRUGS, MEDICAMENTS AND BIOLOGICAL SUBSTANCES, INITIAL ENCOUNTER: ICD-10-CM

## 2018-10-31 DIAGNOSIS — L50.9 URTICARIA, UNSPECIFIED: ICD-10-CM

## 2018-10-31 PROCEDURE — 99244 OFF/OP CNSLTJ NEW/EST MOD 40: CPT | Mod: GC

## 2018-11-03 PROBLEM — L50.9 HIVES: Noted: 2018-08-19

## 2018-11-16 RX ADMIN — OMALIZUMAB 0 MG: 202.5 INJECTION, SOLUTION SUBCUTANEOUS at 00:00

## 2018-11-21 ENCOUNTER — APPOINTMENT (OUTPATIENT)
Dept: PEDIATRIC ALLERGY IMMUNOLOGY | Facility: CLINIC | Age: 48
End: 2018-11-21
Payer: COMMERCIAL

## 2018-11-21 VITALS
OXYGEN SATURATION: 96 % | SYSTOLIC BLOOD PRESSURE: 134 MMHG | HEIGHT: 67 IN | DIASTOLIC BLOOD PRESSURE: 84 MMHG | HEART RATE: 88 BPM

## 2018-11-21 PROCEDURE — 96372 THER/PROPH/DIAG INJ SC/IM: CPT

## 2018-11-21 RX ORDER — OMALIZUMAB 202.5 MG/1.4ML
150 INJECTION, SOLUTION SUBCUTANEOUS
Qty: 1 | Refills: 0 | Status: COMPLETED | OUTPATIENT
Start: 2018-11-16

## 2018-11-25 ENCOUNTER — RX RENEWAL (OUTPATIENT)
Age: 48
End: 2018-11-25

## 2018-12-16 RX ADMIN — OMALIZUMAB 0 MG: 202.5 INJECTION, SOLUTION SUBCUTANEOUS at 00:00

## 2018-12-19 ENCOUNTER — APPOINTMENT (OUTPATIENT)
Dept: NEUROLOGY | Facility: CLINIC | Age: 48
End: 2018-12-19
Payer: COMMERCIAL

## 2018-12-19 VITALS
SYSTOLIC BLOOD PRESSURE: 130 MMHG | WEIGHT: 220 LBS | DIASTOLIC BLOOD PRESSURE: 70 MMHG | BODY MASS INDEX: 34.53 KG/M2 | HEIGHT: 67 IN

## 2018-12-19 PROCEDURE — 99214 OFFICE O/P EST MOD 30 MIN: CPT

## 2018-12-21 ENCOUNTER — APPOINTMENT (OUTPATIENT)
Dept: PEDIATRIC ALLERGY IMMUNOLOGY | Facility: CLINIC | Age: 48
End: 2018-12-21
Payer: COMMERCIAL

## 2018-12-21 VITALS
SYSTOLIC BLOOD PRESSURE: 117 MMHG | WEIGHT: 234.99 LBS | OXYGEN SATURATION: 96 % | BODY MASS INDEX: 36.88 KG/M2 | DIASTOLIC BLOOD PRESSURE: 80 MMHG | HEART RATE: 82 BPM | HEIGHT: 66.97 IN

## 2018-12-21 PROCEDURE — 96372 THER/PROPH/DIAG INJ SC/IM: CPT

## 2018-12-21 RX ORDER — OMALIZUMAB 202.5 MG/1.4ML
150 INJECTION, SOLUTION SUBCUTANEOUS
Qty: 1 | Refills: 0 | Status: COMPLETED | OUTPATIENT
Start: 2018-12-16

## 2018-12-28 ENCOUNTER — OUTPATIENT (OUTPATIENT)
Dept: OUTPATIENT SERVICES | Facility: HOSPITAL | Age: 48
LOS: 1 days | End: 2018-12-28
Payer: COMMERCIAL

## 2018-12-28 ENCOUNTER — APPOINTMENT (OUTPATIENT)
Dept: GASTROENTEROLOGY | Facility: GI CENTER | Age: 48
End: 2018-12-28
Payer: COMMERCIAL

## 2018-12-28 ENCOUNTER — RESULT REVIEW (OUTPATIENT)
Age: 48
End: 2018-12-28

## 2018-12-28 DIAGNOSIS — Z90.13 ACQUIRED ABSENCE OF BILATERAL BREASTS AND NIPPLES: Chronic | ICD-10-CM

## 2018-12-28 DIAGNOSIS — Z90.5 ACQUIRED ABSENCE OF KIDNEY: Chronic | ICD-10-CM

## 2018-12-28 DIAGNOSIS — D30.02 BENIGN NEOPLASM OF LEFT KIDNEY: Chronic | ICD-10-CM

## 2018-12-28 DIAGNOSIS — Z98.890 OTHER SPECIFIED POSTPROCEDURAL STATES: Chronic | ICD-10-CM

## 2018-12-28 DIAGNOSIS — K21.9 GASTRO-ESOPHAGEAL REFLUX DISEASE WITHOUT ESOPHAGITIS: ICD-10-CM

## 2018-12-28 PROCEDURE — 88305 TISSUE EXAM BY PATHOLOGIST: CPT

## 2018-12-28 PROCEDURE — 43239 EGD BIOPSY SINGLE/MULTIPLE: CPT

## 2018-12-28 PROCEDURE — 88342 IMHCHEM/IMCYTCHM 1ST ANTB: CPT

## 2018-12-28 PROCEDURE — 88342 IMHCHEM/IMCYTCHM 1ST ANTB: CPT | Mod: 26

## 2018-12-28 PROCEDURE — 88305 TISSUE EXAM BY PATHOLOGIST: CPT | Mod: 26

## 2019-01-02 LAB — SURGICAL PATHOLOGY STUDY: SIGNIFICANT CHANGE UP

## 2019-01-07 ENCOUNTER — MEDICATION RENEWAL (OUTPATIENT)
Age: 49
End: 2019-01-07

## 2019-01-08 ENCOUNTER — CLINICAL ADVICE (OUTPATIENT)
Age: 49
End: 2019-01-08

## 2019-01-16 RX ADMIN — OMALIZUMAB 0 MG: 202.5 INJECTION, SOLUTION SUBCUTANEOUS at 00:00

## 2019-01-18 ENCOUNTER — APPOINTMENT (OUTPATIENT)
Dept: PEDIATRIC ALLERGY IMMUNOLOGY | Facility: CLINIC | Age: 49
End: 2019-01-18
Payer: COMMERCIAL

## 2019-01-18 VITALS
OXYGEN SATURATION: 96 % | SYSTOLIC BLOOD PRESSURE: 133 MMHG | HEIGHT: 66.97 IN | DIASTOLIC BLOOD PRESSURE: 77 MMHG | WEIGHT: 237 LBS | BODY MASS INDEX: 37.2 KG/M2 | HEART RATE: 87 BPM

## 2019-01-18 PROCEDURE — 99212 OFFICE O/P EST SF 10 MIN: CPT | Mod: 25

## 2019-01-18 PROCEDURE — 96372 THER/PROPH/DIAG INJ SC/IM: CPT

## 2019-01-18 RX ORDER — OMALIZUMAB 202.5 MG/1.4ML
150 INJECTION, SOLUTION SUBCUTANEOUS
Qty: 1 | Refills: 0 | Status: COMPLETED | OUTPATIENT
Start: 2019-01-16

## 2019-01-18 NOTE — CONSULT LETTER
[Dear  ___] : Dear  [unfilled], [Courtesy Letter:] : I had the pleasure of seeing your patient, [unfilled], in my office today. [Please see my note below.] : Please see my note below. [Sincerely,] : Sincerely, [FreeTextEntry3] : Pete Thapa III  MPH, MD, PhD, FACP, FACAAI, FAAAAI \par , Departments of Medicine and Pediatrics \par [employer] [facility] \par , [facility] [employer] [facility] [employer] [facility] \par Attending Physician, Division of Allergy & Immunology [facility]\par \par \par

## 2019-01-18 NOTE — PHYSICAL EXAM
[Alert] : alert [Healthy Appearance] : healthy appearance [No Acute Distress] : no acute distress [No Discharge] : no discharge [Normal TMs] : both tympanic membranes were normal [Normal Nasal Mucosa] : the nasal mucosa was normal [Normal Lips/Tongue] : the lips and tongue were normal [Normal Outer Ear/Nose] : the ears and nose were normal in appearance [No Nasal Discharge] : no nasal discharge [Normal Tonsils] : normal tonsils [No Thrush] : no thrush [Normal Dentition] : normal dentition [No Oral Lesions or Ulcers] : no oral lesions or ulcers [No Neck Mass] : no neck mass was observed [No LAD] : no lymphadenopathy [Normal Rate and Effort] : normal respiratory rhythm and effort [No Crackles] : no crackles [No Retractions] : no retractions [Bilateral Audible Breath Sounds] : bilateral audible breath sounds [Normal Rate] : heart rate was normal  [Normal S1, S2] : normal S1 and S2 [No murmur] : no murmur [Regular Rhythm] : with a regular rhythm [Soft] : abdomen soft [Not Tender] : non-tender [Normal Bowel Sounds] : normal bowel sounds [Not Distended] : not distended [No HSM] : no hepato-splenomegaly [Normal Cervical Lymph Nodes] : cervical [Skin Intact] : skin intact  [No Rash] : no rash [No Skin Lesions] : no skin lesions [No clubbing] : no clubbing [No Edema] : no edema [Full ROM with no contractures] : full range of motion with no contractures [No Motor Deficits] : the motor exam was normal [Normal Mood] : mood was normal [Normal Affect] : affect was normal [Judgment and Insight Age Appropriate] : judgement and insight is age appropriate [Alert, Awake, Oriented as Age-Appropriate] : alert, awake, oriented as age appropriate [Conjunctival Erythema] : no conjunctival erythema [Suborbital Bogginess] : no suborbital bogginess (allergic shiners) [Boggy Nasal Turbinates] : no boggy and/or pale nasal turbinates [Pharyngeal erythema] : no pharyngeal erythema [Exudate] : no exudate [Posterior Pharyngeal Cobblestoning] : no posterior pharyngeal cobblestoning [Clear Rhinorrhea] : no clear rhinorrhea was seen [Wheezing] : no wheezing was heard [Eczematous Patches] : no eczematous patches [Xerosis] : no xerosis

## 2019-01-18 NOTE — HISTORY OF PRESENT ILLNESS
[de-identified] : 47 y/o F with multiple medical issues including dermatographism, hives, drug allergies. \par Here for Xolair shot.\par Pt states that after last Xolair shot on 12/21/18, she developed 1 small hive on leg and another small one on arm. The hives resolved spontaneously and remained small throughout. She also noted that the hives appeared the day her menstrual period started. she is wondering if there's an association. During this period, she was also itchy for a day or so without obvious rash or hives. The pruritus also improved spontaneously.\par She also states that she recently started [employer] and has noted a 4 lb weight gain since starting. \par Otherwise, she is felling well. She believes that the Xolair is helping decrease the frequency and severity of hives. She is happy with how it is working out for her.

## 2019-01-18 NOTE — REVIEW OF SYSTEMS
[Urticaria] : urticaria [Nl] : Genitourinary [SOB with Exertion] : no dyspnea on exertion [Nocturnal Awakening] : no nocturnal awakening with shortness of breath [Wheezing] : no wheezing

## 2019-02-16 RX ADMIN — OMALIZUMAB 0 MG: 202.5 INJECTION, SOLUTION SUBCUTANEOUS at 00:00

## 2019-02-19 ENCOUNTER — APPOINTMENT (OUTPATIENT)
Dept: PEDIATRIC ALLERGY IMMUNOLOGY | Facility: CLINIC | Age: 49
End: 2019-02-19

## 2019-02-22 ENCOUNTER — LABORATORY RESULT (OUTPATIENT)
Age: 49
End: 2019-02-22

## 2019-02-22 ENCOUNTER — APPOINTMENT (OUTPATIENT)
Dept: PEDIATRIC ALLERGY IMMUNOLOGY | Facility: CLINIC | Age: 49
End: 2019-02-22
Payer: COMMERCIAL

## 2019-02-22 ENCOUNTER — MED ADMIN CHARGE (OUTPATIENT)
Age: 49
End: 2019-02-22

## 2019-02-22 VITALS
WEIGHT: 234.99 LBS | DIASTOLIC BLOOD PRESSURE: 82 MMHG | HEART RATE: 77 BPM | OXYGEN SATURATION: 98 % | HEIGHT: 66.97 IN | BODY MASS INDEX: 36.88 KG/M2 | SYSTOLIC BLOOD PRESSURE: 135 MMHG

## 2019-02-22 PROCEDURE — 96372 THER/PROPH/DIAG INJ SC/IM: CPT

## 2019-02-22 RX ORDER — OMALIZUMAB 202.5 MG/1.4ML
150 INJECTION, SOLUTION SUBCUTANEOUS
Qty: 1 | Refills: 0 | Status: COMPLETED | OUTPATIENT
Start: 2019-02-16

## 2019-02-25 ENCOUNTER — TRANSCRIPTION ENCOUNTER (OUTPATIENT)
Age: 49
End: 2019-02-25

## 2019-02-26 ENCOUNTER — TRANSCRIPTION ENCOUNTER (OUTPATIENT)
Age: 49
End: 2019-02-26

## 2019-03-16 RX ADMIN — OMALIZUMAB 0 MG: 202.5 INJECTION, SOLUTION SUBCUTANEOUS at 00:00

## 2019-03-21 ENCOUNTER — APPOINTMENT (OUTPATIENT)
Dept: OBGYN | Facility: CLINIC | Age: 49
End: 2019-03-21
Payer: COMMERCIAL

## 2019-03-21 VITALS
SYSTOLIC BLOOD PRESSURE: 132 MMHG | WEIGHT: 234 LBS | HEIGHT: 66 IN | DIASTOLIC BLOOD PRESSURE: 80 MMHG | BODY MASS INDEX: 37.61 KG/M2

## 2019-03-21 PROCEDURE — 99214 OFFICE O/P EST MOD 30 MIN: CPT

## 2019-03-25 ENCOUNTER — APPOINTMENT (OUTPATIENT)
Dept: PEDIATRIC ALLERGY IMMUNOLOGY | Facility: CLINIC | Age: 49
End: 2019-03-25
Payer: COMMERCIAL

## 2019-03-25 VITALS — DIASTOLIC BLOOD PRESSURE: 78 MMHG | HEART RATE: 78 BPM | OXYGEN SATURATION: 97 % | SYSTOLIC BLOOD PRESSURE: 124 MMHG

## 2019-03-25 PROCEDURE — 96372 THER/PROPH/DIAG INJ SC/IM: CPT

## 2019-03-25 RX ORDER — OMALIZUMAB 202.5 MG/1.4ML
150 INJECTION, SOLUTION SUBCUTANEOUS
Qty: 1 | Refills: 0 | Status: COMPLETED | OUTPATIENT
Start: 2019-03-16

## 2019-03-26 ENCOUNTER — APPOINTMENT (OUTPATIENT)
Dept: OBGYN | Facility: CLINIC | Age: 49
End: 2019-03-26
Payer: COMMERCIAL

## 2019-03-26 VITALS
BODY MASS INDEX: 34.53 KG/M2 | WEIGHT: 220 LBS | DIASTOLIC BLOOD PRESSURE: 73 MMHG | HEIGHT: 67 IN | SYSTOLIC BLOOD PRESSURE: 124 MMHG

## 2019-03-26 PROCEDURE — 58100 BIOPSY OF UTERUS LINING: CPT

## 2019-03-26 PROCEDURE — 76830 TRANSVAGINAL US NON-OB: CPT

## 2019-03-26 NOTE — PROCEDURE
[Endometrial Biopsy] : Endometrial biopsy [Irregular Bleeding] : irregular uterine bleeding [F/U Tamoxifen] : follow-up tamoxifen use [Risks] : risks [Benefits] : benefits [Alternatives] : alternatives [Patient] : patient [CONSENT OBTAINED] : written consent was obtained prior to the procedure. [No Premedication] : No premedication [Tenaculum] : a single toothed tenaculum [Easy Passage] : allowed easy passage of a uterine sound without dilation [Sounded to ___ cm] : sounded to [unfilled] ~Ucm [Abundant] : an abundant [Sent to Histology] : the specimen was place in buffered formalin and sent for pathlogy [Tolerated Well] : the patient tolerated the procedure well [No Complications] : there were no complications

## 2019-03-27 ENCOUNTER — TRANSCRIPTION ENCOUNTER (OUTPATIENT)
Age: 49
End: 2019-03-27

## 2019-04-04 LAB — CORE LAB BIOPSY: NORMAL

## 2019-04-10 ENCOUNTER — TRANSCRIPTION ENCOUNTER (OUTPATIENT)
Age: 49
End: 2019-04-10

## 2019-04-10 ENCOUNTER — RX RENEWAL (OUTPATIENT)
Age: 49
End: 2019-04-10

## 2019-04-16 ENCOUNTER — TRANSCRIPTION ENCOUNTER (OUTPATIENT)
Age: 49
End: 2019-04-16

## 2019-04-16 RX ADMIN — OMALIZUMAB 0 MG: 202.5 INJECTION, SOLUTION SUBCUTANEOUS at 00:00

## 2019-04-17 ENCOUNTER — TRANSCRIPTION ENCOUNTER (OUTPATIENT)
Age: 49
End: 2019-04-17

## 2019-04-29 ENCOUNTER — APPOINTMENT (OUTPATIENT)
Dept: OBGYN | Facility: CLINIC | Age: 49
End: 2019-04-29
Payer: COMMERCIAL

## 2019-04-29 VITALS
WEIGHT: 238 LBS | HEIGHT: 67 IN | SYSTOLIC BLOOD PRESSURE: 116 MMHG | DIASTOLIC BLOOD PRESSURE: 60 MMHG | BODY MASS INDEX: 37.35 KG/M2

## 2019-04-29 DIAGNOSIS — Z01.419 ENCOUNTER FOR GYNECOLOGICAL EXAMINATION (GENERAL) (ROUTINE) W/OUT ABNORMAL FINDINGS: ICD-10-CM

## 2019-04-29 PROCEDURE — 99396 PREV VISIT EST AGE 40-64: CPT

## 2019-05-02 LAB
CYTOLOGY CVX/VAG DOC THIN PREP: NORMAL
HPV HIGH+LOW RISK DNA PNL CVX: NOT DETECTED

## 2019-05-06 ENCOUNTER — APPOINTMENT (OUTPATIENT)
Dept: PEDIATRIC ALLERGY IMMUNOLOGY | Facility: CLINIC | Age: 49
End: 2019-05-06
Payer: COMMERCIAL

## 2019-05-06 VITALS
SYSTOLIC BLOOD PRESSURE: 136 MMHG | BODY MASS INDEX: 36.1 KG/M2 | HEART RATE: 81 BPM | OXYGEN SATURATION: 98 % | WEIGHT: 230 LBS | HEIGHT: 67 IN | DIASTOLIC BLOOD PRESSURE: 80 MMHG

## 2019-05-06 PROCEDURE — 96372 THER/PROPH/DIAG INJ SC/IM: CPT

## 2019-05-06 RX ORDER — OMALIZUMAB 202.5 MG/1.4ML
150 INJECTION, SOLUTION SUBCUTANEOUS
Qty: 1 | Refills: 0 | Status: COMPLETED | OUTPATIENT
Start: 2019-04-16

## 2019-05-13 ENCOUNTER — APPOINTMENT (OUTPATIENT)
Dept: FAMILY MEDICINE | Facility: CLINIC | Age: 49
End: 2019-05-13
Payer: COMMERCIAL

## 2019-05-13 VITALS
SYSTOLIC BLOOD PRESSURE: 138 MMHG | HEIGHT: 67 IN | WEIGHT: 237 LBS | TEMPERATURE: 98.6 F | HEART RATE: 84 BPM | BODY MASS INDEX: 37.2 KG/M2 | DIASTOLIC BLOOD PRESSURE: 80 MMHG

## 2019-05-13 PROCEDURE — 99213 OFFICE O/P EST LOW 20 MIN: CPT

## 2019-05-16 ENCOUNTER — MEDICATION RENEWAL (OUTPATIENT)
Age: 49
End: 2019-05-16

## 2019-05-16 RX ADMIN — OMALIZUMAB 0 MG: 202.5 INJECTION, SOLUTION SUBCUTANEOUS at 00:00

## 2019-05-19 NOTE — PHYSICAL EXAM
[Normal Outer Ear/Nose] : the outer ears and nose were normal in appearance [Supple] : supple [Normal Oropharynx] : the oropharynx was normal [No Lymphadenopathy] : no lymphadenopathy [No Respiratory Distress] : no respiratory distress  [Clear to Auscultation] : lungs were clear to auscultation bilaterally [No Accessory Muscle Use] : no accessory muscle use [Normal Rate] : normal rate  [Regular Rhythm] : with a regular rhythm [Normal S1, S2] : normal S1 and S2 [No Murmur] : no murmur heard [No Acute Distress] : no acute distress [Well Nourished] : well nourished [Normal TMs] : both tympanic membranes were normal

## 2019-05-19 NOTE — PLAN
[FreeTextEntry1] : SUPPORTIVE CARE: REST, FLUIDS, STEAM\par USE OF NASAL SPRAY AND ALLERGY MEDICATIONS\par HAS INHALER PRN AS DIRECTED\par ASTHMA ACTION PLAN\par LIKELY VIRAL URI; TO CALL IF SYMPTOMS PERSIST/WORSEN\par CALL WITH ANY QUESTIONS, CONCERNS OR CHANGES

## 2019-05-19 NOTE — HISTORY OF PRESENT ILLNESS
[FreeTextEntry8] : MS. MARTIN IS A PLEASANT 47 YO PRESENTING FOR AN ACUTE VISIT. COMPLAINING OF INTERMITTENT DRY COUGH, WHEEZING, AND CONGESTION OF CHEST.  NO FEVER. NO N/V/D. NO SORE THROAT. SYMPTOMS INITIALLY PRESENTED TWO DAYS AGO.  IS SICK AS WELL. HAS HAD NO CHEST PAIN, HEADACHE, DIZZINESS, GI OR URINARY COMPLAINTS. ACTIVELY SEEING ALLERGIST AND USES RESCUE INHALER.  IS AWAITING AUTHORIZATION FOR XOLAIR.  ON SINGULAIR.  NO OTHER COMPLAINTS TODAY.

## 2019-05-19 NOTE — ADDENDUM
[FreeTextEntry1] : I, Siri Valerio, acted solely as a scribe for Dr. Alla Odonnell on this date 5/13/19.

## 2019-05-31 ENCOUNTER — MEDICATION RENEWAL (OUTPATIENT)
Age: 49
End: 2019-05-31

## 2019-06-10 ENCOUNTER — TRANSCRIPTION ENCOUNTER (OUTPATIENT)
Age: 49
End: 2019-06-10

## 2019-06-14 ENCOUNTER — APPOINTMENT (OUTPATIENT)
Dept: PEDIATRIC ALLERGY IMMUNOLOGY | Facility: CLINIC | Age: 49
End: 2019-06-14
Payer: COMMERCIAL

## 2019-06-14 VITALS — HEART RATE: 91 BPM | DIASTOLIC BLOOD PRESSURE: 80 MMHG | OXYGEN SATURATION: 96 % | SYSTOLIC BLOOD PRESSURE: 135 MMHG

## 2019-06-14 PROCEDURE — 96372 THER/PROPH/DIAG INJ SC/IM: CPT

## 2019-06-14 RX ORDER — OMALIZUMAB 202.5 MG/1.4ML
150 INJECTION, SOLUTION SUBCUTANEOUS
Qty: 1 | Refills: 0 | Status: COMPLETED | OUTPATIENT
Start: 2019-05-16

## 2019-06-17 ENCOUNTER — TRANSCRIPTION ENCOUNTER (OUTPATIENT)
Age: 49
End: 2019-06-17

## 2019-07-03 ENCOUNTER — APPOINTMENT (OUTPATIENT)
Dept: PEDIATRIC ALLERGY IMMUNOLOGY | Facility: CLINIC | Age: 49
End: 2019-07-03
Payer: COMMERCIAL

## 2019-07-03 VITALS
DIASTOLIC BLOOD PRESSURE: 85 MMHG | HEART RATE: 91 BPM | BODY MASS INDEX: 35.94 KG/M2 | WEIGHT: 228.99 LBS | SYSTOLIC BLOOD PRESSURE: 130 MMHG | HEIGHT: 66.97 IN | OXYGEN SATURATION: 98 %

## 2019-07-03 PROCEDURE — 99214 OFFICE O/P EST MOD 30 MIN: CPT

## 2019-07-03 RX ORDER — FAMOTIDINE 20 MG/1
20 TABLET, FILM COATED ORAL
Qty: 60 | Refills: 1 | Status: COMPLETED | COMMUNITY
End: 2019-07-03

## 2019-07-03 RX ORDER — GABAPENTIN 300 MG/1
300 CAPSULE ORAL 3 TIMES DAILY
Qty: 270 | Refills: 0 | Status: COMPLETED | COMMUNITY
Start: 2017-12-07 | End: 2019-07-03

## 2019-07-03 RX ORDER — METHYLPREDNISOLONE 4 MG/1
4 TABLET ORAL
Qty: 1 | Refills: 0 | Status: COMPLETED | COMMUNITY
Start: 2019-05-16 | End: 2019-07-03

## 2019-07-03 RX ORDER — MISOPROSTOL 200 UG/1
200 TABLET ORAL
Qty: 2 | Refills: 0 | Status: COMPLETED | COMMUNITY
Start: 2019-03-21 | End: 2019-07-03

## 2019-07-05 NOTE — DATA REVIEWED
[FreeTextEntry1] : CBC from 6/20/2019 revealed mild lymphocytosis- 3400, as well as immature granulocytes.

## 2019-07-05 NOTE — REVIEW OF SYSTEMS
[Fever] : no fever [Difficulty Breathing] : no dyspnea [Cough] : no cough [SOB with Exertion] : no dyspnea on exertion [Wheezing] : no wheezing [Urticaria] : no urticaria [Atopic Dermatitis] : no atopic dermatitis [Pruritis] : pruritis [Dry Skin] : ~L dry skin [Easy Bleeding] : no ~M tendency for easy bleeding [Easy Bruising] : no tendency for easy bruising [Nl] : ENT

## 2019-07-05 NOTE — PHYSICAL EXAM
[Alert] : alert [Healthy Appearance] : healthy appearance [No Acute Distress] : no acute distress [No Discharge] : no discharge [No Photophobia] : no photophobia [Sclera Not Icteric] : sclera not icteric [Conjunctival Erythema] : no conjunctival erythema [Normal TMs] : both tympanic membranes were normal [Normal Lips/Tongue] : the lips and tongue were normal [Normal Outer Ear/Nose] : the ears and nose were normal in appearance [No Thrush] : no thrush [Supple] : the neck was supple [No Oral Lesions or Ulcers] : no oral lesions or ulcers [Normal Rate and Effort] : normal respiratory rhythm and effort [No Crackles] : no crackles [No Retractions] : no retractions [Bilateral Audible Breath Sounds] : bilateral audible breath sounds [Wheezing] : no wheezing was heard [Normal Rate] : heart rate was normal  [Regular Rhythm] : with a regular rhythm [Soft] : abdomen soft [Not Distended] : not distended [Normal Cervical Lymph Nodes] : cervical [No HSM] : no hepato-splenomegaly [Normal Axillary Lumph Nodes] : axillary [Skin Intact] : skin intact  [No Rash] : no rash [Eczematous Patches] : no eczematous patches [Xerosis] : xerosis [No clubbing] : no clubbing [No Edema] : no edema [Normal Mood] : mood was normal [No Cyanosis] : no cyanosis [Normal Affect] : affect was normal [Alert, Awake, Oriented as Age-Appropriate] : alert, awake, oriented as age appropriate [de-identified] : BMI-35.9

## 2019-07-05 NOTE — HISTORY OF PRESENT ILLNESS
[de-identified] : 48-year-old female with history of breast cancer currently on tamoxifen and congenital hip dysplasia, s/p hip replacement, was originally evaluated for adverse drug  reactions in 2018 in the setting of chronic urticaria.\par \par = she has been on Xolair 300 mg for chronic urticaria since 10/2018 and her hives resolved completely but in the last few months her pruritis has been worsening, but no visible urticaria. Itching mostly occurs prior to and during her periods.\par \par History and drug allergies plan  reviewed:\par ADVERSE EFFECTS OF DRUGS:\par  Hives within 30 minutes of Aspirin ingestion on two separate occasions are concerning for IgE-mediated reaction to Aspirin. However, it is unusual to have this reaction persist despite beyond days discontinuation of the medicine. A subset of patients with underlying chronic urticaria can have worsening of symptoms upon exposure to NSAIDs, but those reactions are typically not IgE-mediated and patients typically react to all NSAIDs. Patient did not have recurrence of hives after receiving one dose of Xolair. \par - Patient should continue avoiding all NSAIDs due to possibility of a severe reaction\par - If patient requires NSAIDs for a medical condition, recommend using NSAID other than aspirin and give first dose in the office as a challenge\par - If patient can come off of antihistamines, will perform penicillin skin testing\par - Patient can receive oral levofloxacin as this was well tolerated after the local reaction with IV infusion of the same antibiotic. Recommend avoiding IV levaquin as patient had injection reactions twice.

## 2019-07-05 NOTE — CONSULT LETTER
[Dear  ___] : Dear  [unfilled], [Courtesy Letter:] : I had the pleasure of seeing your patient, [unfilled], in my office today. [Please see my note below.] : Please see my note below. [Consult Closing:] : Thank you very much for allowing me to participate in the care of this patient.  If you have any questions, please do not hesitate to contact me. [Sincerely,] : Sincerely, [FreeTextEntry3] : Radha Fatima MD FAAMULUI, ROSEMARY\par Adult and Pediatric Allergy, Asthma and Clinical Immunology\par  of Medicine and Pediatrics at\par   [facility]\par Section Head, Adult Allergy and Immunology\par   [facility]\par   Division of Allergy, Asthma and Immunology\par   [address], [place] [address]\par   [place], [place] [address]\par   Phone [phone]  Fax [phone]\par \par \par
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
## 2019-07-16 RX ADMIN — OMALIZUMAB 0 MG: 202.5 INJECTION, SOLUTION SUBCUTANEOUS at 00:00

## 2019-07-17 ENCOUNTER — APPOINTMENT (OUTPATIENT)
Dept: PEDIATRIC ALLERGY IMMUNOLOGY | Facility: CLINIC | Age: 49
End: 2019-07-17
Payer: COMMERCIAL

## 2019-07-17 VITALS
WEIGHT: 228.99 LBS | DIASTOLIC BLOOD PRESSURE: 70 MMHG | HEART RATE: 72 BPM | SYSTOLIC BLOOD PRESSURE: 116 MMHG | BODY MASS INDEX: 35.9 KG/M2

## 2019-07-17 PROCEDURE — 96372 THER/PROPH/DIAG INJ SC/IM: CPT

## 2019-07-17 RX ORDER — OMALIZUMAB 202.5 MG/1.4ML
150 INJECTION, SOLUTION SUBCUTANEOUS
Qty: 1 | Refills: 0 | Status: COMPLETED | OUTPATIENT
Start: 2019-07-16

## 2019-08-16 RX ADMIN — OMALIZUMAB 0 MG: 202.5 INJECTION, SOLUTION SUBCUTANEOUS at 00:00

## 2019-09-03 ENCOUNTER — TRANSCRIPTION ENCOUNTER (OUTPATIENT)
Age: 49
End: 2019-09-03

## 2019-09-03 ENCOUNTER — APPOINTMENT (OUTPATIENT)
Dept: PEDIATRIC ALLERGY IMMUNOLOGY | Facility: CLINIC | Age: 49
End: 2019-09-03
Payer: COMMERCIAL

## 2019-09-03 VITALS — HEART RATE: 73 BPM | OXYGEN SATURATION: 98 % | SYSTOLIC BLOOD PRESSURE: 124 MMHG | DIASTOLIC BLOOD PRESSURE: 84 MMHG

## 2019-09-03 PROCEDURE — 96372 THER/PROPH/DIAG INJ SC/IM: CPT

## 2019-09-03 RX ORDER — OMALIZUMAB 202.5 MG/1.4ML
150 INJECTION, SOLUTION SUBCUTANEOUS
Qty: 1 | Refills: 0 | Status: COMPLETED | OUTPATIENT
Start: 2019-08-16

## 2019-09-04 ENCOUNTER — MED ADMIN CHARGE (OUTPATIENT)
Age: 49
End: 2019-09-04

## 2019-09-04 RX ORDER — OMALIZUMAB 202.5 MG/1.4ML
150 INJECTION, SOLUTION SUBCUTANEOUS
Qty: 1 | Refills: 0 | Status: COMPLETED
Start: 2018-10-22

## 2019-10-04 ENCOUNTER — RX RENEWAL (OUTPATIENT)
Age: 49
End: 2019-10-04

## 2019-10-14 ENCOUNTER — OUTPATIENT (OUTPATIENT)
Dept: OUTPATIENT SERVICES | Facility: HOSPITAL | Age: 49
LOS: 1 days | End: 2019-10-14

## 2019-10-14 ENCOUNTER — APPOINTMENT (OUTPATIENT)
Dept: ULTRASOUND IMAGING | Facility: CLINIC | Age: 49
End: 2019-10-14
Payer: COMMERCIAL

## 2019-10-14 DIAGNOSIS — Z90.13 ACQUIRED ABSENCE OF BILATERAL BREASTS AND NIPPLES: Chronic | ICD-10-CM

## 2019-10-14 DIAGNOSIS — Z90.5 ACQUIRED ABSENCE OF KIDNEY: Chronic | ICD-10-CM

## 2019-10-14 DIAGNOSIS — Z00.8 ENCOUNTER FOR OTHER GENERAL EXAMINATION: ICD-10-CM

## 2019-10-14 DIAGNOSIS — Z98.890 OTHER SPECIFIED POSTPROCEDURAL STATES: Chronic | ICD-10-CM

## 2019-10-14 DIAGNOSIS — D30.02 BENIGN NEOPLASM OF LEFT KIDNEY: Chronic | ICD-10-CM

## 2019-10-14 PROCEDURE — 20611 DRAIN/INJ JOINT/BURSA W/US: CPT | Mod: RT

## 2019-10-15 ENCOUNTER — APPOINTMENT (OUTPATIENT)
Dept: PEDIATRIC ALLERGY IMMUNOLOGY | Facility: CLINIC | Age: 49
End: 2019-10-15
Payer: COMMERCIAL

## 2019-10-15 VITALS — DIASTOLIC BLOOD PRESSURE: 84 MMHG | HEART RATE: 78 BPM | SYSTOLIC BLOOD PRESSURE: 138 MMHG | OXYGEN SATURATION: 97 %

## 2019-10-15 PROCEDURE — 96372 THER/PROPH/DIAG INJ SC/IM: CPT

## 2019-11-06 ENCOUNTER — INBOUND DOCUMENT (OUTPATIENT)
Age: 49
End: 2019-11-06

## 2019-11-14 ENCOUNTER — MEDICATION RENEWAL (OUTPATIENT)
Age: 49
End: 2019-11-14

## 2019-11-14 ENCOUNTER — TRANSCRIPTION ENCOUNTER (OUTPATIENT)
Age: 49
End: 2019-11-14

## 2019-11-15 NOTE — ED PROVIDER NOTE - CPE EDP CARDIAC NORM
OPERATIVE PROCEDURE(s):    cabg redmitted for left leg pain               SURGEON(s): yuli    SUBJECTIVE ASSESSMENT: pt is complaining of right calf pain that comes and goes. Pt had bedside pleurodesis with Doxycycline and tolerated procedure well after receiving Dilaudid    Vital Signs Last 24 Hrs  T(C): 36.8 (15 Nov 2019 10:00), Max: 37.3 (14 Nov 2019 19:36)  T(F): 98.2 (15 Nov 2019 10:00), Max: 99.1 (14 Nov 2019 19:36)  HR: 84 (15 Nov 2019 13:00) (75 - 93)  BP: 148/70 (15 Nov 2019 13:00) (96/56 - 165/80)  BP(mean): 100 (15 Nov 2019 13:00) (71 - 100)  RR: 28 (15 Nov 2019 13:00) (16 - 28)  SpO2: 97% (15 Nov 2019 13:00) (96% - 99%)  11-14-19 @ 07:01  -  11-15-19 @ 07:00  --------------------------------------------------------  IN: 730 mL / OUT: 1665 mL / NET: -935 mL    11-15-19 @ 07:01  -  11-15-19 @ 13:27  --------------------------------------------------------  IN: 600 mL / OUT: 740 mL / NET: -140 mL        Physical Exam  General: alert and oriented x 3  Chest: mild dec bs at right base   CVS: s1s2  Abd: pos bs soft nt  GI/ :  Ext: no sig edema  incisions- c/d/i  sternum- intact    Central Venous Catheter: Yes[ ]  No[x ] , If Yes indication:           Day #    Castellon Catheter: Yes  [x ] , No [ ] : If yes indication:      chronic retention -- pt being seen by                    Day #    NGT: Yes [ ] No [ x ]     If Yes Placement:                                          Day #    Post-Op Beta-Blockers: Yes [x ], No[ ], If No, then contraindication:    CHEST TUBE:  [x ] YES [ ] NO  OUTPUT:     per 24 hours    AIR LEAKS:  [ ] YES [ ] NO      EPICARDIAL WIRES:  [ ] YES [x ] NO      BOWEL MOVEMENT:  [x ] YES [ ] NO      LABS:                        9.7    6.80  )-----------( 223      ( 14 Nov 2019 22:31 )             30.6     COUMADIN:   [ ] YES [x ] NO      11-14    151<H>  |  116<H>  |  44<H>  ----------------------------<  160<H>  4.4   |  22  |  1.1    Ca    9.2      14 Nov 2019 22:31  Mg     1.8     11-14    MEDICATIONS  (STANDING):  albuterol/ipratropium for Nebulization 3 milliLiter(s) Nebulizer every 6 hours  ascorbic acid 500 milliGRAM(s) Oral daily  aspirin  chewable 81 milliGRAM(s) Oral daily  atorvastatin 40 milliGRAM(s) Oral at bedtime  carvedilol 12.5 milliGRAM(s) Oral every 12 hours  dextrose 5%. 1000 milliLiter(s) (50 mL/Hr) IV Continuous <Continuous>  dextrose 50% Injectable 12.5 Gram(s) IV Push once  dextrose 50% Injectable 25 Gram(s) IV Push once  dextrose 50% Injectable 25 Gram(s) IV Push once  furosemide    Tablet 20 milliGRAM(s) Oral daily  heparin  Injectable 5000 Unit(s) SubCutaneous every 8 hours  insulin lispro (HumaLOG) corrective regimen sliding scale   SubCutaneous three times a day before meals  megestrol Suspension 800 milliGRAM(s) Oral daily  metFORMIN 1000 milliGRAM(s) Oral every 12 hours  mirtazapine 7.5 milliGRAM(s) Oral at bedtime  nystatin Cream 1 Application(s) Topical two times a day  pantoprazole    Tablet 40 milliGRAM(s) Oral before breakfast  sacubitril 49 mG/valsartan 51 mG 1 Tablet(s) Oral two times a day  sertraline 25 milliGRAM(s) Oral daily  sodium chloride 0.9% lock flush 3 milliLiter(s) IV Push every 8 hours  spironolactone 25 milliGRAM(s) Oral daily  tiotropium 18 MICROgram(s) Capsule 1 Capsule(s) Inhalation daily  zinc sulfate 220 milliGRAM(s) Oral daily    MEDICATIONS  (PRN):  acetaminophen   Tablet .. 650 milliGRAM(s) Oral every 6 hours PRN Mild Pain (1 - 3)  dextrose 40% Gel 15 Gram(s) Oral once PRN Blood Glucose LESS THAN 70 milliGRAM(s)/deciliter  doxycycline Intrapleural Syringe 500 milliGRAM(s) IntraPleural. once PRN to bedside for procedure  glucagon  Injectable 1 milliGRAM(s) IntraMuscular once PRN Glucose LESS THAN 70 milligrams/deciliter  lidocaine 1% Injectable 20 milliLiter(s) Local Injection once PRN to bedside for procedure  ondansetron Injectable 4 milliGRAM(s) IV Push every 8 hours PRN Nausea and/or Vomiting  oxyCODONE    IR 5 milliGRAM(s) Oral at bedtime PRN Mild Pain (1 - 3)      Allergies    No Known Allergies    Intolerances    Ambulation/Activity Status: pt needs assistance with ambulation and has been in bed most of the day after procedure      RADIOLOGY & ADDITIONAL TESTS:  Xray Chest 1 View- PORTABLE-Routine: AM   Indication: Shortness of Breath  Transport: Portable,  w/ Monitor  Provider's Contact #: [phone] (11-15-19 @ 08:58)    EXAM:  XR CHEST PORTABLE ROUTINE 1V            PROCEDURE DATE:  11/15/2019      INTERPRETATION:  Clinical History / Reason for exam: Shortness of breath.    Comparison : Chest radiograph prior day.    Technique/Positioning: Frontal portable.    Findings:    Support devices: Telemetry leads are seen.    Cardiac/mediastinum/hilum: Heart is enlarged. The patient is status post   median sternotomy and left atrial clip. There is a right pleural catheter   that appears incompletely locked.    Lung parenchyma/Pleura: Bilateral opacifications and effusions.    Skeleton/soft tissues: Unchanged.    Impression:      Cardiomegaly with CHF. Worsening.    Assessment/Plan: Patient is a 64 yo female s/p cabg and now readmitted for right calf pain  cont present tx as per ct surgeon  s/p cabg - cont asa statin and coreg  chronic systolic dysfunction - cont entresto; start aldactone; and cont diuresis with lasix   s/p pleurodesis - will cont to monitor effusion    Social Service Disposition:  to snf early next week normal...

## 2019-11-23 ENCOUNTER — TRANSCRIPTION ENCOUNTER (OUTPATIENT)
Age: 49
End: 2019-11-23

## 2019-12-06 ENCOUNTER — APPOINTMENT (OUTPATIENT)
Dept: NEUROLOGY | Facility: CLINIC | Age: 49
End: 2019-12-06
Payer: COMMERCIAL

## 2019-12-06 VITALS
HEIGHT: 66 IN | WEIGHT: 220 LBS | DIASTOLIC BLOOD PRESSURE: 80 MMHG | SYSTOLIC BLOOD PRESSURE: 120 MMHG | BODY MASS INDEX: 35.36 KG/M2

## 2019-12-06 PROCEDURE — 99214 OFFICE O/P EST MOD 30 MIN: CPT

## 2019-12-06 NOTE — ASSESSMENT
[FreeTextEntry1] : Peripheral neuropathy. Chronic back pain.\par \par Clinically stable. Will renew gabapentin 300 mg 3 times a day, Flexeril as needed.\par \par Followup in one year, sooner should the need arise.

## 2019-12-06 NOTE — PHYSICAL EXAM
[General Appearance - Alert] : alert [General Appearance - Well-Appearing] : healthy appearing [General Appearance - In No Acute Distress] : in no acute distress [Oriented To Time, Place, And Person] : oriented to person, place, and time [Impaired Insight] : insight and judgment were intact [Affect] : the affect was normal [Memory Recent] : recent memory was not impaired [Time] : oriented to time [Place] : oriented to place [Person] : oriented to person [Fluency] : fluency intact [Concentration Intact] : normal concentrating ability [Comprehension] : comprehension intact [Cranial Nerves Optic (II)] : visual acuity intact bilaterally,  visual fields full to confrontation, pupils equal round and reactive to light [Cranial Nerves Oculomotor (III)] : extraocular motion intact [Cranial Nerves Trigeminal (V)] : facial sensation intact symmetrically [Cranial Nerves Facial (VII)] : face symmetrical [Motor Tone] : muscle tone was normal in all four extremities [Cranial Nerves Vestibulocochlear (VIII)] : hearing was intact bilaterally [Motor Strength] : muscle strength was normal in all four extremities [No Muscle Atrophy] : normal bulk in all four extremities [Paresis Pronator Drift Right-Sided] : no pronator drift on the right [Sensation Tactile Decrease] : light touch was intact [Paresis Pronator Drift Left-Sided] : no pronator drift on the left [Romberg's Sign] : Romberg's sign was negtive [Sensation Pain / Temperature Decrease] : pain and temperature was intact [Abnormal Walk] : normal gait [Balance] : balance was intact [Past-pointing] : there was no past-pointing [Coordination - Dysmetria Impaired Finger-to-Nose Bilateral] : not present [Tremor] : no tremor present [Coordination - Dysmetria Impaired Heel-to-Shin Bilateral] : not present [1+] : Patella right 1+ [2+] : Brachioradialis left 2+ [0] : Ankle jerk right 0 [Plantar Reflex Right Only] : normal on the right [Plantar Reflex Left Only] : normal on the left [No Tenderness to Palpation] : no spine tenderness on palpation [Full ROM] : full ROM [No Pain with ROM] : no pain with motion in any direction

## 2019-12-06 NOTE — CONSULT LETTER
[Dear  ___] : Dear  [unfilled], [Consult Letter:] : I had the pleasure of evaluating your patient, [unfilled]. [Please see my note below.] : Please see my note below. [Consult Closing:] : Thank you very much for allowing me to participate in the care of this patient.  If you have any questions, please do not hesitate to contact me. [Sincerely,] : Sincerely, [FreeTextEntry3] : Khoi Martinez MD, PhD\par

## 2019-12-06 NOTE — HISTORY OF PRESENT ILLNESS
[FreeTextEntry1] : I have been following her for a number of years for peripheral neuropathy felt related to prior chemotherapy for breast cancer. She is on gabapentin 300 mg 3 times a day with excellent control of her neuropathic pain.\par \par She also some chronic back pain and uses an occasional Flexeril.\par \par She is S/P  left hip replacement.

## 2019-12-10 ENCOUNTER — APPOINTMENT (OUTPATIENT)
Dept: PEDIATRIC ALLERGY IMMUNOLOGY | Facility: CLINIC | Age: 49
End: 2019-12-10
Payer: COMMERCIAL

## 2019-12-10 VITALS — DIASTOLIC BLOOD PRESSURE: 79 MMHG | SYSTOLIC BLOOD PRESSURE: 126 MMHG | HEART RATE: 80 BPM

## 2019-12-10 PROCEDURE — 96372 THER/PROPH/DIAG INJ SC/IM: CPT

## 2019-12-24 ENCOUNTER — TRANSCRIPTION ENCOUNTER (OUTPATIENT)
Age: 49
End: 2019-12-24

## 2019-12-26 ENCOUNTER — TRANSCRIPTION ENCOUNTER (OUTPATIENT)
Age: 49
End: 2019-12-26

## 2019-12-30 ENCOUNTER — APPOINTMENT (OUTPATIENT)
Dept: FAMILY MEDICINE | Facility: CLINIC | Age: 49
End: 2019-12-30
Payer: COMMERCIAL

## 2019-12-30 VITALS
WEIGHT: 238 LBS | HEIGHT: 66 IN | HEART RATE: 86 BPM | SYSTOLIC BLOOD PRESSURE: 118 MMHG | BODY MASS INDEX: 38.25 KG/M2 | TEMPERATURE: 99.3 F | DIASTOLIC BLOOD PRESSURE: 60 MMHG

## 2019-12-30 LAB
FLUAV SPEC QL CULT: NEGATIVE
FLUBV AG SPEC QL IA: NEGATIVE

## 2019-12-30 PROCEDURE — 87804 INFLUENZA ASSAY W/OPTIC: CPT | Mod: 59,QW

## 2019-12-30 PROCEDURE — 99214 OFFICE O/P EST MOD 30 MIN: CPT | Mod: 25

## 2019-12-30 RX ORDER — EPINEPHRINE 0.3 MG/.3ML
0.3 INJECTION INTRAMUSCULAR
Qty: 2 | Refills: 2 | Status: DISCONTINUED | COMMUNITY
End: 2019-12-30

## 2019-12-30 RX ORDER — HYDROCORTISONE ACETATE AND PRAMOXINE HYDROCHLORIDE 25; 10 MG/G; MG/G
2.5-1 CREAM TOPICAL
Qty: 1 | Refills: 1 | Status: DISCONTINUED | COMMUNITY
Start: 2018-07-31 | End: 2019-12-30

## 2019-12-30 RX ORDER — SODIUM PICOSULFATE, MAGNESIUM OXIDE, AND ANHYDROUS CITRIC ACID 10; 3.5; 12 MG/16.2G; G/16.2G; G/16.2G
10-3.5-12 POWDER, METERED ORAL
Qty: 1 | Refills: 0 | Status: DISCONTINUED | COMMUNITY
Start: 2018-07-31 | End: 2019-12-30

## 2019-12-30 NOTE — HISTORY OF PRESENT ILLNESS
[FreeTextEntry8] : MS. MARTIN IS A PLEASANT 49 YEAR OLD PATIENT PRESENTING FOR AN ACUTE VISIT. PRESENTED TO URGENT CARE FOR PRODUCTIVE COUGH AND CHEST TIGHTNESS 6 DAYS AGO. PATIENT WAS DIAGNOSED WITH ASTHMATIC BRONCHITIS AND PRESCRIBED PREDNISONE. FOLLOWED UP 2 DAYS LATER DUE TO SHORTNESS OF BREATH ASSOCIATED WITH HER COUGHING AND HAD EKG AND CHEST X-RAY WHICH WERE REPORTEDLY NORMAL. ADVISED TO TAKE A DOSE OF HER TORSEMIDE.  HAS HAD A PRODUCTIVE COUGH FOR OVER A WEEK. ALSO COMPLAINS OF CONGESTION, SNEEZING WITH NASAL DISCHARGE, AND HAD WHEEZING. SYMPTOMS ARE NOW IMPROVING INCLUDING SHORTNESS OF BREATH.  HAS USED INHALER AND NEBULIZER. DENIES FEVER, VOMITING OR DIARRHEA. USING NASAL SPRAY.  NO OTHER COMPLAINTS TODAY.

## 2019-12-30 NOTE — ADDENDUM
[FreeTextEntry1] : I, Padmini Mak, acted solely as a scribe for Dr. Odonnell on this date [12/30/2019].

## 2019-12-30 NOTE — END OF VISIT
[FreeTextEntry3] : All medical record entries made by the Scribe were at my, Dr. Odonnell's, direction and personally dictated by me on [12/30/2019]. I have reviewed the chart and agree that the record accurately reflects my personal performance of the history, physical exam, assessment and plan. I have also personally directed, reviewed and agreed with the chart.

## 2019-12-30 NOTE — PHYSICAL EXAM
[No Acute Distress] : no acute distress [Well Nourished] : well nourished [Well Developed] : well developed [Well-Appearing] : well-appearing [No Lymphadenopathy] : no lymphadenopathy [Supple] : supple [No Respiratory Distress] : no respiratory distress  [No Accessory Muscle Use] : no accessory muscle use [Clear to Auscultation] : lungs were clear to auscultation bilaterally [Normal Rate] : normal rate  [Regular Rhythm] : with a regular rhythm [Normal S1, S2] : normal S1 and S2 [No Edema] : there was no peripheral edema [No Murmur] : no murmur heard [Normal Affect] : the affect was normal [Normal Insight/Judgement] : insight and judgment were intact [Normal Outer Ear/Nose] : the outer ears and nose were normal in appearance [Normal Oropharynx] : the oropharynx was normal [Normal TMs] : both tympanic membranes were normal [No Joint Swelling] : no joint swelling [Speech Grossly Normal] : speech grossly normal [Grossly Normal Strength/Tone] : grossly normal strength/tone

## 2019-12-30 NOTE — PLAN
[FreeTextEntry1] : RAPID FLU - NEGATIVE\par SUPPORTIVE CARE: REST, FLUIDS, STEAM\par CONTINUED USE OF NASAL SPRAY\par MUCINEX\par COMPLETE PREDNISONE TAPER\par DUE TO CHRONICITY OF COUGH WILL TREAT WITH ZPAK\par USE OF NEBULIZER AS DIRECTED\par FOLLOW-UP CARDIOLOGY\par CALL WITH ANY QUESTIONS, CONCERNS OR CHANGES \par AWARE WHEN TO SEEK EMERGENT CARE\par FOLLOW-UP FOR ROUTINE CARE

## 2019-12-30 NOTE — REVIEW OF SYSTEMS
[Negative] : Neurological [Nasal Discharge] : nasal discharge [Cough] : cough [Postnasal Drip] : postnasal drip [Earache] : no earache [FreeTextEntry4] : CONGESTION, SNEEZING [FreeTextEntry6] : SEE HPI

## 2020-02-13 NOTE — REVIEW OF SYSTEMS
No significant past surgical history [Negative] : Cardiovascular [Fatigue] : fatigue [Wheezing] : wheezing [Cough] : cough [Nasal Discharge] : nasal discharge [Fever] : no fever [Chills] : no chills [Earache] : no earache [Sore Throat] : no sore throat

## 2020-07-14 ENCOUNTER — APPOINTMENT (OUTPATIENT)
Dept: PEDIATRIC ALLERGY IMMUNOLOGY | Facility: CLINIC | Age: 50
End: 2020-07-14
Payer: COMMERCIAL

## 2020-07-14 PROCEDURE — 99214 OFFICE O/P EST MOD 30 MIN: CPT | Mod: 95

## 2020-07-14 RX ORDER — BROMPHENIRAMINE MALEATE, PSEUDOEPHEDRINE HYDROCHLORIDE, 2; 30; 10 MG/5ML; MG/5ML; MG/5ML
30-2-10 SYRUP ORAL
Qty: 280 | Refills: 0 | Status: COMPLETED | COMMUNITY
Start: 2019-12-26 | End: 2020-07-14

## 2020-07-14 RX ORDER — AZITHROMYCIN 250 MG/1
250 TABLET, FILM COATED ORAL
Qty: 6 | Refills: 0 | Status: COMPLETED | COMMUNITY
Start: 2019-05-15 | End: 2020-07-14

## 2020-07-14 NOTE — CONSULT LETTER
[Dear  ___] : Dear  [unfilled], [Courtesy Letter:] : I had the pleasure of seeing your patient, [unfilled], in my office today. [Sincerely,] : Sincerely, [Please see my note below.] : Please see my note below. [FreeTextEntry3] : Pete Thapa III  MPH, MD, PhD, FACP, FACAAI, FAAAAI \par , Departments of Medicine and Pediatrics \par [employer] [facility] [employer] [facility] \par , [facility] [employer] [facility] \par Attending Physician, Division of Allergy & Immunology [facility]\par \par \par

## 2020-07-14 NOTE — HISTORY OF PRESENT ILLNESS
[Asthma] : asthma [Eczematous rashes] : eczematous rashes [Venom Reactions] : venom reactions [Food Allergies] : food allergies [Home] : at home, [unfilled] , at the time of the visit. [de-identified] : 50 y/o F with multiple medical issues including dermatographism, hives, drug allergies. \par last seen 1/2019. \par Due to COVID19, she hasn't had Xolair since Dec 2019. She was previously getting Xolair Q4-5 weeks with good control of hives. She continues to use oral antihistamines and Singulair. Despite missing Xolair, she has not had any hives flares. She does admit that the pruritus is still present, maybe slightly less than before starting Xolair. \par She is continuing with gentle skin care, using dye free, scent free products, and trying to apply emollients repeatedly throughout the day. \par Otherwise she is feeling well. She believes that the Xolair helped with the hives initially, but now that she has been off it for ~ 6 month, her hives continue to be well controlled on just antihistamines and Singulair. Although  the rashless pruritus is not addressed as well as she would like, she is quite content with her current state.\par she has not had any exposures to penicillin, aspirin, levofloxacin, NSAIDs, Contac sinus.\par she has history of positive ImmunoCAP to grass pollen. She currently denies nasal or ocular complaints. In addition to daily antihistamines and montelukast, she is using intranasal azelastine as needed. [Other Location: e.g. Home (Enter Location, City,State)___] : at [unfilled] [Spouse] : spouse [Verbal consent obtained from patient] : the patient, [unfilled]

## 2020-07-14 NOTE — REASON FOR VISIT
[Routine Follow-Up] : a routine follow-up visit for [To Medication] : allergy to medication [Hives] : hives

## 2020-07-14 NOTE — END OF VISIT
[FreeTextEntry3] : Verbal consent was obtained from patient for telehealth services due to the COVID-19 pandemic. Audio and video communication were conducted via the [employer] platform. The patient understands the risks of these platforms and limitations of telemedicine with regards to diagnosis and treatment without the ability to perform a thorough physical examination.\par  [Time Spent: ___ minutes] : I have spent [unfilled] minutes of time on the encounter. [>50% of the face to face encounter time was spent on counseling and/or coordination of care for ___] : Greater than 50% of the face to face encounter time was spent on counseling and/or coordination of care for [unfilled]

## 2020-07-14 NOTE — REVIEW OF SYSTEMS
[SOB with Exertion] : no dyspnea on exertion [Nocturnal Awakening] : no nocturnal awakening with shortness of breath [Wheezing] : no wheezing [Urticaria] : no urticaria [Pruritus] : pruritus [Nl] : Genitourinary

## 2020-08-26 ENCOUNTER — TRANSCRIPTION ENCOUNTER (OUTPATIENT)
Age: 50
End: 2020-08-26

## 2020-09-04 ENCOUNTER — APPOINTMENT (OUTPATIENT)
Dept: GYNECOLOGIC ONCOLOGY | Facility: CLINIC | Age: 50
End: 2020-09-04

## 2020-09-17 ENCOUNTER — TRANSCRIPTION ENCOUNTER (OUTPATIENT)
Age: 50
End: 2020-09-17

## 2020-09-18 ENCOUNTER — TRANSCRIPTION ENCOUNTER (OUTPATIENT)
Age: 50
End: 2020-09-18

## 2020-09-29 ENCOUNTER — TRANSCRIPTION ENCOUNTER (OUTPATIENT)
Age: 50
End: 2020-09-29

## 2020-10-06 ENCOUNTER — TRANSCRIPTION ENCOUNTER (OUTPATIENT)
Age: 50
End: 2020-10-06

## 2020-10-19 ENCOUNTER — LABORATORY RESULT (OUTPATIENT)
Age: 50
End: 2020-10-19

## 2020-10-19 ENCOUNTER — APPOINTMENT (OUTPATIENT)
Dept: FAMILY MEDICINE | Facility: CLINIC | Age: 50
End: 2020-10-19
Payer: COMMERCIAL

## 2020-10-19 VITALS
WEIGHT: 242 LBS | BODY MASS INDEX: 38.89 KG/M2 | HEART RATE: 87 BPM | HEIGHT: 66 IN | SYSTOLIC BLOOD PRESSURE: 130 MMHG | DIASTOLIC BLOOD PRESSURE: 70 MMHG

## 2020-10-19 DIAGNOSIS — Z01.419 ENCOUNTER FOR GYNECOLOGICAL EXAMINATION (GENERAL) (ROUTINE) W/OUT ABNORMAL FINDINGS: ICD-10-CM

## 2020-10-19 DIAGNOSIS — Z87.42 PERSONAL HISTORY OF OTHER DISEASES OF THE FEMALE GENITAL TRACT: ICD-10-CM

## 2020-10-19 DIAGNOSIS — Z01.82 ENCOUNTER FOR ALLERGY TESTING: ICD-10-CM

## 2020-10-19 DIAGNOSIS — N83.299 OTHER OVARIAN CYST, UNSPECIFIED SIDE: ICD-10-CM

## 2020-10-19 DIAGNOSIS — Z87.09 PERSONAL HISTORY OF OTHER DISEASES OF THE RESPIRATORY SYSTEM: ICD-10-CM

## 2020-10-19 DIAGNOSIS — Z01.42 ENCOUNTER FOR CERVICAL SMEAR TO CONFIRM FINDINGS OF RECENT NORMAL SMEAR FOLLOWING INITIAL ABNORMAL SMEAR: ICD-10-CM

## 2020-10-19 DIAGNOSIS — J06.9 ACUTE UPPER RESPIRATORY INFECTION, UNSPECIFIED: ICD-10-CM

## 2020-10-19 DIAGNOSIS — L85.3 XEROSIS CUTIS: ICD-10-CM

## 2020-10-19 DIAGNOSIS — K92.1 MELENA: ICD-10-CM

## 2020-10-19 DIAGNOSIS — Z87.898 PERSONAL HISTORY OF OTHER SPECIFIED CONDITIONS: ICD-10-CM

## 2020-10-19 DIAGNOSIS — R21 RASH AND OTHER NONSPECIFIC SKIN ERUPTION: ICD-10-CM

## 2020-10-19 DIAGNOSIS — N92.1 EXCESSIVE AND FREQUENT MENSTRUATION WITH IRREGULAR CYCLE: ICD-10-CM

## 2020-10-19 PROCEDURE — 99072 ADDL SUPL MATRL&STAF TM PHE: CPT

## 2020-10-19 PROCEDURE — 99396 PREV VISIT EST AGE 40-64: CPT | Mod: 25

## 2020-10-19 PROCEDURE — 36415 COLL VENOUS BLD VENIPUNCTURE: CPT

## 2020-10-20 LAB
25(OH)D3 SERPL-MCNC: 35 NG/ML
ALBUMIN SERPL ELPH-MCNC: 4.4 G/DL
ALP BLD-CCNC: 84 U/L
ALT SERPL-CCNC: 31 U/L
ANION GAP SERPL CALC-SCNC: 13 MMOL/L
AST SERPL-CCNC: 21 U/L
BASOPHILS # BLD AUTO: 0.07 K/UL
BASOPHILS NFR BLD AUTO: 0.8 %
BILIRUB SERPL-MCNC: 0.2 MG/DL
BUN SERPL-MCNC: 11 MG/DL
CALCIUM SERPL-MCNC: 9.7 MG/DL
CHLORIDE SERPL-SCNC: 106 MMOL/L
CHOLEST SERPL-MCNC: 178 MG/DL
CHOLEST/HDLC SERPL: 3.5 RATIO
CO2 SERPL-SCNC: 24 MMOL/L
CREAT SERPL-MCNC: 0.85 MG/DL
EOSINOPHIL # BLD AUTO: 0.25 K/UL
EOSINOPHIL NFR BLD AUTO: 2.9 %
ESTIMATED AVERAGE GLUCOSE: 114 MG/DL
GLUCOSE SERPL-MCNC: 93 MG/DL
HBA1C MFR BLD HPLC: 5.6 %
HCT VFR BLD CALC: 44.1 %
HCV AB SER QL: NONREACTIVE
HCV S/CO RATIO: 0.15 S/CO
HDLC SERPL-MCNC: 51 MG/DL
HGB BLD-MCNC: 13.5 G/DL
HIV1+2 AB SPEC QL IA.RAPID: NONREACTIVE
IMM GRANULOCYTES NFR BLD AUTO: 0.5 %
LDLC SERPL CALC-MCNC: 101 MG/DL
LYMPHOCYTES # BLD AUTO: 2.19 K/UL
LYMPHOCYTES NFR BLD AUTO: 25.3 %
MAN DIFF?: NORMAL
MCHC RBC-ENTMCNC: 28.8 PG
MCHC RBC-ENTMCNC: 30.6 GM/DL
MCV RBC AUTO: 94 FL
MONOCYTES # BLD AUTO: 0.63 K/UL
MONOCYTES NFR BLD AUTO: 7.3 %
NEUTROPHILS # BLD AUTO: 5.49 K/UL
NEUTROPHILS NFR BLD AUTO: 63.2 %
PLATELET # BLD AUTO: 290 K/UL
POTASSIUM SERPL-SCNC: 4.9 MMOL/L
PROT SERPL-MCNC: 7.3 G/DL
RBC # BLD: 4.69 M/UL
RBC # FLD: 13.8 %
SODIUM SERPL-SCNC: 143 MMOL/L
T4 FREE SERPL-MCNC: 0.9 NG/DL
TRIGL SERPL-MCNC: 135 MG/DL
TSH SERPL-ACNC: 2.43 UIU/ML
WBC # FLD AUTO: 8.67 K/UL

## 2020-10-21 LAB
APPEARANCE: CLEAR
BILIRUBIN URINE: NEGATIVE
BLOOD URINE: ABNORMAL
COLOR: NORMAL
GLUCOSE QUALITATIVE U: NEGATIVE
KETONES URINE: NEGATIVE
LEUKOCYTE ESTERASE URINE: NEGATIVE
NITRITE URINE: NEGATIVE
PH URINE: 6
PROTEIN URINE: NEGATIVE
SPECIFIC GRAVITY URINE: 1.01
UROBILINOGEN URINE: NORMAL

## 2020-10-21 NOTE — PLAN
[FreeTextEntry1] : NEED COLONOSCOPY REPORT FROM [facility]\par EKG DECLINED - DONE WITH CLEARANCE AND CARDIOLOGY\par HEALTHY DIET AND LIFESTYLE MODIFICATIONS; HEALTHY WEIGHT LOSS \par MONITOR LAB WORK\par VISION SCREENING\par FOLLOW-UP ALL SPECIALISTS AS DIRECTED \par CONSIDER TDAP AND SHINGRIX\par ASTHMA CONTROLLED\par CALL WITH ANY QUESTIONS, CONCERNS OR CHANGES \par

## 2020-10-21 NOTE — HEALTH RISK ASSESSMENT
[Very Good] : ~his/her~  mood as very good [Yes] : Yes [1 or 2 (0 pts)] : 1 or 2 (0 points) [Monthly or less (1 pt)] : Monthly or less (1 point) [Never (0 pts)] : Never (0 points) [No] : In the past 12 months have you used drugs other than those required for medical reasons? No [No falls in past year] : Patient reported no falls in the past year [Patient reported PAP Smear was normal] : Patient reported PAP Smear was normal [Patient reported colonoscopy was normal] : Patient reported colonoscopy was normal [HIV Test offered] : HIV Test offered [Hepatitis C test offered] : Hepatitis C test offered [None] : None [With Family] : lives with family [# of Members in Household ___] :  household currently consist of [unfilled] member(s) [Employed] : employed [] :  [College] : College [# Of Children ___] : has [unfilled] children [Feels Safe at Home] : Feels safe at home [Fully functional (using the telephone, shopping, preparing meals, housekeeping, doing laundry, using] : Fully functional and needs no help or supervision to perform IADLs (using the telephone, shopping, preparing meals, housekeeping, doing laundry, using transportation, managing medications and managing finances) [Fully functional (bathing, dressing, toileting, transferring, walking, feeding)] : Fully functional (bathing, dressing, toileting, transferring, walking, feeding) [Reports normal functional visual acuity (ie: able to read med bottle)] : Reports normal functional visual acuity [Smoke Detector] : smoke detector [Carbon Monoxide Detector] : carbon monoxide detector [Safety elements used in home] : safety elements used in home [Seat Belt] :  uses seat belt [Sunscreen] : uses sunscreen [With Patient/Caregiver] : With Patient/Caregiver [Designated Healthcare Proxy] : Designated healthcare proxy [Relationship: ___] : Relationship: [unfilled] [Name: ___] : Health Care Proxy's Name: [unfilled]  [] : No [de-identified] : hysterectomy on 9/24/20 [Audit-CScore] : 1 [de-identified] : WALKING 2 1/2 MILES A DAY SINCE SEPTEMBER.  PLANS TO START YOGA [de-identified] : TRYING TO IMPROVE DIET.  WATCHING PORTIONS AND CHOICES.  OCCASIONAL SWEETS.  AVOIDING FRIED FOODS.   [Change in mental status noted] : No change in mental status noted [Language] : denies difficulty with language [Learning/Retaining New Information] : denies difficulty learning/retaining new information [Handling Complex Tasks] : denies difficulty handling complex tasks [Sexually Active] : not sexually active [High Risk Behavior] : no high risk behavior [Reports changes in hearing] : Reports no changes in hearing [Reports changes in vision] : Reports no changes in vision [Reports changes in dental health] : Reports no changes in dental health [MammogramDate] : 10/08 [MammogramComments] : S/P BILATERAL MASTECTOMY [PapSmearDate] : 09/20 [PapSmearComments] : PRIOR TO HYSTERECTOMY [ColonoscopyDate] : 12/19 [ColonoscopyComments] : YANET [de-identified] : GLASSES FOR DRIVING AT NIGHT OR WATCHING TV [AdvancecareDate] : 10/20

## 2020-10-21 NOTE — HISTORY OF PRESENT ILLNESS
[FreeTextEntry1] : cpe [de-identified] : MS. MARTIN IS A PLEASANT 51 YO PRESENTING FOR YEARLY CPE.  STATES SHE HAS NO ISSUES OR CONCERNS TODAY.  NOTES THAT SHE HAD [place]ST[place]CT[place] [facility]  WAS DONE DUE TO HEAVY BLEEDING AND HISTORY OF BREAST CANCER.  CHRONIC HISTORY OF ASTHMA.  USING VENTOLIN VERY INFREQUENTLY.  WITH ACTIVITY.  ONCE EVERY OTHER MONTH. SEES NEUROLOGY FOR PERIPHERAL NEUROPATHY.  ON GABAPENTIN.  WAS HAVING TROUBLE SLEEPING AFTER SURGERY AND GIVEN ZOLPIDEM BY [facility].\par \par DERMATOLOGY: [facility] - FOR [place]CREENING\par NEUROLOGY: DR. CURRIE; PERIPHERAL NEUROPATHY\par CARDIOLOGY: DR. HARDING\par ALLERGIST: DR. ZAMUDIO

## 2020-10-22 ENCOUNTER — TRANSCRIPTION ENCOUNTER (OUTPATIENT)
Age: 50
End: 2020-10-22

## 2020-10-23 ENCOUNTER — TRANSCRIPTION ENCOUNTER (OUTPATIENT)
Age: 50
End: 2020-10-23

## 2020-10-28 ENCOUNTER — APPOINTMENT (OUTPATIENT)
Dept: FAMILY MEDICINE | Facility: CLINIC | Age: 50
End: 2020-10-28

## 2020-11-20 ENCOUNTER — APPOINTMENT (OUTPATIENT)
Dept: FAMILY MEDICINE | Facility: CLINIC | Age: 50
End: 2020-11-20
Payer: COMMERCIAL

## 2020-11-20 ENCOUNTER — RX RENEWAL (OUTPATIENT)
Age: 50
End: 2020-11-20

## 2020-11-20 VITALS
DIASTOLIC BLOOD PRESSURE: 72 MMHG | SYSTOLIC BLOOD PRESSURE: 130 MMHG | HEIGHT: 66 IN | TEMPERATURE: 97.7 F | BODY MASS INDEX: 38.89 KG/M2 | WEIGHT: 242 LBS

## 2020-11-20 DIAGNOSIS — Z87.898 PERSONAL HISTORY OF OTHER SPECIFIED CONDITIONS: ICD-10-CM

## 2020-11-20 DIAGNOSIS — H93.12 TINNITUS, LEFT EAR: ICD-10-CM

## 2020-11-20 PROCEDURE — 99213 OFFICE O/P EST LOW 20 MIN: CPT

## 2020-11-20 NOTE — HISTORY OF PRESENT ILLNESS
[FreeTextEntry8] : Ms. SHARAD MARTIN is a 50 year old female with PMH of GERD, chronic urticaria, left side breast cancer s/p bilateral mastectomy on 2008, chemo and radiation last ones on 2009 who normally follows up with Dr Odonnell. Patient comes to the office for left side tinnitu and a feeling like a "cotton ball in my ear". Patient also reports ear and sinus congestions, admits sore throat, scratchy voce, postnasal drip. She reports some problems with hearing acute sounds since then. Denies dizziness, unsteady gait, vision problems, nausea, vomiting, similar previous episodes. Denies history of stroke or cardiac conditions.\par \par \par \par

## 2020-11-20 NOTE — PLAN
[FreeTextEntry1] : \par Regarding tinnitus: No signs of infections at this time as per physical exam. Also no signs of CNS involvement at this time. Its only on her left ear. Could it be related to previous Aspirin reaction/toxicity? or to her previous chemotherapy agents? Reports that her regimen was Adriamycin, Carboplatin and cyclophosphamide. Carboplatin has a 1% risk of ototoxicity \par I advised her to take Tylenol as needed as she can't take NSADIs, to monitor for worsening symptoms including but not limited to dizziness, nausea, unsteady gait, worsening hearing impairment, nystagmus, and to go to the emergency department if she has them.\par I advised this patient to follow up with her neuro: Dr Martinez. She has an appointment with him on 12/04/2020\par I am also referring her to audiology as she complaints of hearing impairment and I started her a referral for ENT whom she last visited years ago. \par \par \par Follows up at [facility] whom she reports gave her zolpidem for her insomnia which patient reports that takes minimally PRN since 2008. As per [employer] reference # [id number] she was last prescribed 14 pills on 09/21/2020 and before that 10 pills on 01/26/2020. She has also been prescribed xanax and hydromorphone, short term courses since 09/24/2020.\par Will renew a short course of zolpidem once

## 2020-11-20 NOTE — PHYSICAL EXAM
[Normal TMs] : both tympanic membranes were normal [Normal Nasal Mucosa] : the nasal mucosa was normal [No Carotid Bruits] : no carotid bruits [Normal Posterior Cervical Nodes] : no posterior cervical lymphadenopathy [Normal] : normal gait, coordination grossly intact, no focal deficits and deep tendon reflexes were 2+ and symmetric [Cranial Nerves Oculomotor (III)] : the extraocular motions were intact [Cranial Nerves Trigeminal (V)] : sensation to the face and masseter strength were intact [Cranial Nerves Facial (VII)] : facial strength was intact bilaterally [Cranial Nerves Accessory (XI - Cranial And Spinal)] : shoulder shrug was intact bilaterally [2+] : left 2+

## 2020-12-04 ENCOUNTER — APPOINTMENT (OUTPATIENT)
Dept: MRI IMAGING | Facility: CLINIC | Age: 50
End: 2020-12-04
Payer: COMMERCIAL

## 2020-12-04 ENCOUNTER — OUTPATIENT (OUTPATIENT)
Dept: OUTPATIENT SERVICES | Facility: HOSPITAL | Age: 50
LOS: 1 days | End: 2020-12-04

## 2020-12-04 DIAGNOSIS — Z98.890 OTHER SPECIFIED POSTPROCEDURAL STATES: Chronic | ICD-10-CM

## 2020-12-04 DIAGNOSIS — Z90.13 ACQUIRED ABSENCE OF BILATERAL BREASTS AND NIPPLES: Chronic | ICD-10-CM

## 2020-12-04 DIAGNOSIS — Z00.8 ENCOUNTER FOR OTHER GENERAL EXAMINATION: ICD-10-CM

## 2020-12-04 DIAGNOSIS — Z90.5 ACQUIRED ABSENCE OF KIDNEY: Chronic | ICD-10-CM

## 2020-12-04 DIAGNOSIS — D30.02 BENIGN NEOPLASM OF LEFT KIDNEY: Chronic | ICD-10-CM

## 2020-12-04 PROCEDURE — 70553 MRI BRAIN STEM W/O & W/DYE: CPT | Mod: 26

## 2020-12-15 ENCOUNTER — APPOINTMENT (OUTPATIENT)
Dept: NEUROLOGY | Facility: CLINIC | Age: 50
End: 2020-12-15
Payer: COMMERCIAL

## 2020-12-15 PROCEDURE — 99213 OFFICE O/P EST LOW 20 MIN: CPT | Mod: 95

## 2020-12-16 ENCOUNTER — TRANSCRIPTION ENCOUNTER (OUTPATIENT)
Age: 50
End: 2020-12-16

## 2020-12-19 ENCOUNTER — RX RENEWAL (OUTPATIENT)
Age: 50
End: 2020-12-19

## 2020-12-22 ENCOUNTER — TRANSCRIPTION ENCOUNTER (OUTPATIENT)
Age: 50
End: 2020-12-22

## 2021-01-15 ENCOUNTER — TRANSCRIPTION ENCOUNTER (OUTPATIENT)
Age: 51
End: 2021-01-15

## 2021-01-19 ENCOUNTER — TRANSCRIPTION ENCOUNTER (OUTPATIENT)
Age: 51
End: 2021-01-19

## 2021-01-29 ENCOUNTER — APPOINTMENT (OUTPATIENT)
Dept: PEDIATRIC ALLERGY IMMUNOLOGY | Facility: CLINIC | Age: 51
End: 2021-01-29
Payer: COMMERCIAL

## 2021-01-29 DIAGNOSIS — L50.3 DERMATOGRAPHIC URTICARIA: ICD-10-CM

## 2021-01-29 PROCEDURE — 99213 OFFICE O/P EST LOW 20 MIN: CPT | Mod: 95

## 2021-01-29 NOTE — HISTORY OF PRESENT ILLNESS
[Home] : at home, [unfilled] , at the time of the visit. [Other Location: e.g. Home (Enter Location, City,State)___] : at [unfilled] [Verbal consent obtained from patient] : the patient, [unfilled] [de-identified] : 49 y/o F with multiple medical issues including dermatographism, hives, drug allergies. last seen 7/2020. \par \par She states she is feeling well. She wants to talk about covid vaccination. She is a cancer survivor (in remission, not currently on treatment).  She has history of angioedema to aspirin. she has hx of developing rash after Levaquin IV (IV may have infiltrated while infusing). she has avoided IV Levaquin since. Otherwise, she denies any hx of known allergies to components to the vaccines, any other hx of adverse reaction to injectable medications or polyethylene glycol or polysorbate. She denies have dermatologic fillers in her body. She has used MiraLAX for bowel prep multiple times in the past without complaints.\par \par her hives have not been bothering her despite being off Xolair for >1 yr. \par \par she currently denies any nasal or ocular complaints.

## 2021-01-29 NOTE — CONSULT LETTER
[Dear  ___] : Dear  [unfilled], [Courtesy Letter:] : I had the pleasure of seeing your patient, [unfilled], in my office today. [Please see my note below.] : Please see my note below. [Sincerely,] : Sincerely, [FreeTextEntry3] : Pete Thapa III  MPH, MD, PhD, FACP, FACAAI, FAAAAI \par , Departments of Medicine and Pediatrics \par [employer] [facility] [employer] [facility] \par , Center for Health Innovations and Outcomes Research [facility] Research \par Attending Physician, Division of Allergy & Immunology [facility]\par \par \par

## 2021-01-29 NOTE — END OF VISIT
[FreeTextEntry3] : Verbal consent was obtained from patient for telehealth services due to the COVID-19 pandemic. Audio and video communication were conducted via the [employer] platform. The patient understands the risks of these platforms and limitations of telemedicine with regards to diagnosis and treatment without the ability to perform a thorough physical examination.\par  [Time Spent: ___ minutes] : I have spent [unfilled] minutes of time on the encounter.

## 2021-01-29 NOTE — PHYSICAL EXAM
[Alert] : alert [No Acute Distress] : no acute distress [Normal Mood] : mood was normal [Judgment and Insight Age Appropriate] : judgement and insight is age appropriate [Alert, Awake, Oriented as Age-Appropriate] : alert, awake, oriented as age appropriate

## 2021-02-09 ENCOUNTER — TRANSCRIPTION ENCOUNTER (OUTPATIENT)
Age: 51
End: 2021-02-09

## 2021-02-26 ENCOUNTER — APPOINTMENT (OUTPATIENT)
Dept: PEDIATRIC ALLERGY IMMUNOLOGY | Facility: CLINIC | Age: 51
End: 2021-02-26
Payer: COMMERCIAL

## 2021-02-26 DIAGNOSIS — C50.919 MALIGNANT NEOPLASM OF UNSPECIFIED SITE OF UNSPECIFIED FEMALE BREAST: ICD-10-CM

## 2021-02-26 PROCEDURE — 99214 OFFICE O/P EST MOD 30 MIN: CPT | Mod: 95

## 2021-03-02 PROBLEM — C50.919 BREAST CANCER IN FEMALE: Status: ACTIVE | Noted: 2017-05-10

## 2021-03-02 NOTE — CONSULT LETTER
[Dear  ___] : Dear  [unfilled], [Courtesy Letter:] : I had the pleasure of seeing your patient, [unfilled], in my office today. [Please see my note below.] : Please see my note below. [Sincerely,] : Sincerely, [FreeTextEntry3] : Pete Thapa III  MPH, MD, PhD, FACP, FACAAI, FAAAAI \par , Departments of Medicine and Pediatrics \par [employer] [facility] [employer] at [facility] \par , Center for Health Innovations and Outcomes Research [facility] Research \par Attending Physician, Division of Allergy & Immunology [facility]\par \par \par

## 2021-03-02 NOTE — HISTORY OF PRESENT ILLNESS
[Home] : at home, [unfilled] , at the time of the visit. [Other Location: e.g. Home (Enter Location, City,State)___] : at [unfilled] [Verbal consent obtained from patient] : the patient, [unfilled] [de-identified] : 49 y/o F with multiple medical issues including dermatographism, hives, drug allergies. Last seen 1/29/21.\par \par 1. Do you have a history of a severe allergic reaction to an injectable medication (intravenous, intramuscular, or subcutaneous)? NO\par 2. Do you have a history of a severe allergic reaction to a prior vaccine? NO\par 3. Do you have a history of a severe allergic reaction to another allergen (e.g., food, venom, or latex)? NO\par 4. Do you have a history of a severe allergic reaction to polyethylene glycol (PEG), a polysorbate or polyoxyl 35 castor oil (e.g. paclitaxel) containing injectable or vaccine? NO - most recent colonoscopy January 2020. Took miralax 2 days ago with no problem - takes this very so often.  \par Hx of breast cancer 13 years ago - chemo ACT - adriamycin, cytoxan and taxol. No reaction to taxol.\par \par Possible reaction to dye \par Had a hip injection of a steroid as well as a possible dye injection. Felt funny after so she received Benadryl. No hives, but felt a warmth and a lump in her throat sensation.\par [phone] o\par \par After this  she had hip replacement surgery, July 2018 - 2 weeks post -op was taking high dose aspirin 620 daily as DVT ppx. Once day, 30 minutes later  she developed an itchy, hive-like rash on her leg. Then had an odd feeling, Same thing the next day.\par Switched to xarelto. 4 days later she had angioedema of her lips, eyes, tongue. This continued for about 10 days-2 weeks. Stopped Xolair and continued to have hives but no angioedema. Started Xolair Oct 2019 and after the first dose she felt a wave of relief.\par Previously diagnosed wit [facility] and was on Xolair from October 2019 - Dec 2019.\par \par Takes levocetirizine everyday qHS. Takes Hydroxyzine most night.\par \par January 2021:\par She states she is feeling well. She wants to talk about covid vaccination. She is a cancer survivor (in remission, not currently on treatment).  She has history of angioedema to aspirin. she has hx of developing rash after Levaquin IV (IV may have infiltrated while infusing). she has avoided IV Levaquin since. Otherwise, she denies any hx of known allergies to components to the vaccines, any other hx of adverse reaction to injectable medications or polyethylene glycol or polysorbate. She denies have dermatologic fillers in her body. She has used MiraLAX for bowel prep multiple times in the past without complaints.\par \par her hives have not been bothering her despite being off Xolair for >1 yr. \par \par she currently denies any nasal or ocular complaints.

## 2021-03-02 NOTE — REVIEW OF SYSTEMS
[Pruritus] : pruritus [Nl] : Genitourinary [SOB with Exertion] : no dyspnea on exertion [Nocturnal Awakening] : no nocturnal awakening with shortness of breath [Wheezing] : no wheezing [Urticaria] : no urticaria

## 2021-03-02 NOTE — END OF VISIT
[Time Spent: ___ minutes] : I have spent [unfilled] minutes of time on the encounter. [FreeTextEntry3] : Verbal consent was obtained from patient for telehealth services due to the COVID-19 pandemic. Audio and video communication were conducted via the [employer] platform. The patient understands the risks of these platforms and limitations of telemedicine with regards to diagnosis and treatment without the ability to perform a thorough physical examination.\par

## 2021-04-17 ENCOUNTER — TRANSCRIPTION ENCOUNTER (OUTPATIENT)
Age: 51
End: 2021-04-17

## 2021-05-28 ENCOUNTER — RX RENEWAL (OUTPATIENT)
Age: 51
End: 2021-05-28

## 2021-06-17 ENCOUNTER — RX RENEWAL (OUTPATIENT)
Age: 51
End: 2021-06-17

## 2021-07-22 NOTE — PATIENT PROFILE ADULT. - NSTOBACCO TYPE_GEN_A_CORE_RD
Erin Merino for oncology nutrition phone follow up today at [address] requested a call back 30 mins later as he was busy at the time of call 
Sera [employer] again for oncology nutrition phone follow up, at ~10:30 per pt request  No answer, LVM with RD contact info asking that he call back as able/desired 
Cigarettes

## 2021-09-10 ENCOUNTER — NON-APPOINTMENT (OUTPATIENT)
Age: 51
End: 2021-09-10

## 2021-09-10 ENCOUNTER — APPOINTMENT (OUTPATIENT)
Dept: FAMILY MEDICINE | Facility: CLINIC | Age: 51
End: 2021-09-10
Payer: COMMERCIAL

## 2021-09-10 VITALS
DIASTOLIC BLOOD PRESSURE: 60 MMHG | HEIGHT: 66 IN | WEIGHT: 252 LBS | OXYGEN SATURATION: 98 % | BODY MASS INDEX: 40.5 KG/M2 | SYSTOLIC BLOOD PRESSURE: 112 MMHG | HEART RATE: 90 BPM | RESPIRATION RATE: 12 BRPM

## 2021-09-10 DIAGNOSIS — I88.9 NONSPECIFIC LYMPHADENITIS, UNSPECIFIED: ICD-10-CM

## 2021-09-10 PROCEDURE — 99214 OFFICE O/P EST MOD 30 MIN: CPT

## 2021-09-10 NOTE — END OF VISIT
[FreeTextEntry3] : This note was written by Nitza Gaines on 09/10/2021, acting as a scribe for Dr. Sridhar MD.\par \par All medical record entries were at my, Dr. Staci Waller MD, direction and personally dictated by me in 09/10/2021. I have personally reviewed the chart and agree that the record accurately reflects my personal performance of the history, physical exam, assessment, and plan.\par

## 2021-09-10 NOTE — ASSESSMENT
[FreeTextEntry1] : SHARAD MARTIN is a 51 year old female patient presenting to the clinic today for lump on right side of jaw. \par \par # PE/Vitals\par - normal \par \par # Reviewed Patient Symptoms\par - lump on the right side of the jaw \par - possibly could be sialadenitis

## 2021-09-10 NOTE — PHYSICAL EXAM
[No Acute Distress] : no acute distress [Well Nourished] : well nourished [Well Developed] : well developed [Well-Appearing] : well-appearing [Normal Sclera/Conjunctiva] : normal sclera/conjunctiva [PERRL] : pupils equal round and reactive to light [EOMI] : extraocular movements intact [Normal Outer Ear/Nose] : the outer ears and nose were normal in appearance [Normal Oropharynx] : the oropharynx was normal [No JVD] : no jugular venous distention [No Lymphadenopathy] : no lymphadenopathy [Supple] : supple [Thyroid Normal, No Nodules] : the thyroid was normal and there were no nodules present [No Accessory Muscle Use] : no accessory muscle use [No Respiratory Distress] : no respiratory distress  [Normal Rate] : normal rate  [Clear to Auscultation] : lungs were clear to auscultation bilaterally [Regular Rhythm] : with a regular rhythm [Normal S1, S2] : normal S1 and S2 [No Murmur] : no murmur heard [No Carotid Bruits] : no carotid bruits [No Abdominal Bruit] : a ~M bruit was not heard ~T in the abdomen [No Varicosities] : no varicosities [Pedal Pulses Present] : the pedal pulses are present [No Edema] : there was no peripheral edema [No Palpable Aorta] : no palpable aorta [No Extremity Clubbing/Cyanosis] : no extremity clubbing/cyanosis [Soft] : abdomen soft [Non Tender] : non-tender [Non-distended] : non-distended [No Masses] : no abdominal mass palpated [No HSM] : no HSM [Normal Bowel Sounds] : normal bowel sounds [Normal Anterior Cervical Nodes] : no anterior cervical lymphadenopathy [Normal Posterior Cervical Nodes] : no posterior cervical lymphadenopathy [No CVA Tenderness] : no CVA  tenderness [No Spinal Tenderness] : no spinal tenderness [No Joint Swelling] : no joint swelling [No Rash] : no rash [Grossly Normal Strength/Tone] : grossly normal strength/tone [Coordination Grossly Intact] : coordination grossly intact [No Focal Deficits] : no focal deficits [Normal Gait] : normal gait [Deep Tendon Reflexes (DTR)] : deep tendon reflexes were 2+ and symmetric [Normal Affect] : the affect was normal [Normal Insight/Judgement] : insight and judgment were intact

## 2021-09-10 NOTE — HISTORY OF PRESENT ILLNESS
[FreeTextEntry8] : SHARAD MARTIN is a 51 year old female patient presenting to the clinic today for lump on the right side of her jaw. Patient noticed the lump on the right side of her jaw yesterday. Denies having any pain but sore only when touched. Possibly could be sialadenitis. Also, patient is having sinus pressure. No fever ,no pain,no swallowing issue

## 2021-09-10 NOTE — PLAN
[FreeTextEntry1] : # order US Neck \par # Start Rx Clindamycin 300 MG, advise to take one capsule three times a day \par # advise to do steam inhalation \par # advise to increase water intake \par # f/u if symptom worsen

## 2021-09-13 ENCOUNTER — TRANSCRIPTION ENCOUNTER (OUTPATIENT)
Age: 51
End: 2021-09-13

## 2021-09-14 ENCOUNTER — TRANSCRIPTION ENCOUNTER (OUTPATIENT)
Age: 51
End: 2021-09-14

## 2021-10-13 ENCOUNTER — TRANSCRIPTION ENCOUNTER (OUTPATIENT)
Age: 51
End: 2021-10-13

## 2021-10-18 ENCOUNTER — APPOINTMENT (OUTPATIENT)
Dept: PEDIATRIC ALLERGY IMMUNOLOGY | Facility: CLINIC | Age: 51
End: 2021-10-18

## 2021-10-18 DIAGNOSIS — Z71.85 ENCOUNTER FOR IMMUNIZATION SAFETY COUNSELING: ICD-10-CM

## 2021-10-18 DIAGNOSIS — L50.1 IDIOPATHIC URTICARIA: ICD-10-CM

## 2021-10-18 DIAGNOSIS — L29.9 PRURITUS, UNSPECIFIED: ICD-10-CM

## 2021-10-18 DIAGNOSIS — T50.905D ADVERSE EFFECT OF UNSPECIFIED DRUGS, MEDICAMENTS AND BIOLOGICAL SUBSTANCES, SUBSEQUENT ENCOUNTER: ICD-10-CM

## 2021-10-18 NOTE — CONSULT LETTER
[Dear  ___] : Dear  [unfilled], [Courtesy Letter:] : I had the pleasure of seeing your patient, [unfilled], in my office today. [Please see my note below.] : Please see my note below. [Sincerely,] : Sincerely, [FreeTextEntry3] : Pete Thapa III  MPH, MD, PhD, FACP, FACAAI, FAAAAI \par , Departments of Medicine and Pediatrics \par [employer] [facility] [employer] [facility] \par , [facility] \par Attending Physician, Division of Allergy & Immunology [facility]\par \par \par

## 2021-10-18 NOTE — REVIEW OF SYSTEMS
[Pruritus] : pruritus [Nl] : Genitourinary [Eye Redness] : no redness [Eye Itching] : no itchy eyes [Rhinorrhea] : no rhinorrhea [Nasal Congestion] : no nasal congestion [Sneezing] : no sneezing [SOB with Exertion] : no dyspnea on exertion [Nocturnal Awakening] : no nocturnal awakening with shortness of breath [Wheezing] : no wheezing [Urticaria] : no urticaria [de-identified] : see hpi

## 2021-10-18 NOTE — HISTORY OF PRESENT ILLNESS
[Home] : at home, [unfilled] , at the time of the visit. [Other Location: e.g. Home (Enter Location, City,State)___] : at [unfilled] [Verbal consent obtained from patient] : the patient, [unfilled] [de-identified] : 50 y/o F with multiple medical issues including dermatographism, hives, drug allergies. last seen 1/2021. \par \par She states she is currently feeling well. She hasn't had any hives for a few months but still has itchiness. She is taking levocetirizine 5mg daily, Atarax 50mg QHS, and Singulair 10 mg QHS. Approximately 2 weeks ago, she had a sinus infection. this was the 1st time she had hives in a long time. She is wondering if she should do further work up for autoimmunity or other cause of hives. She had evaluation at [facility] a few yrs ago which was reportedly unremarkable. Limited autoimmune work up done with us in 2018 was negative for anti-IgE receptor Ab, anti-thyroid Abs, antimitochondrial Abs.\par \par Allergic rhinitis: ImmunoCAP positive to grass in 2018. she currently denies any nasal or ocular complaints. in addition to above antihistamines and Singulair, she is also using azelastine nose spray prn.\par \par Drug allergy: no exposures to PCN, Levaquin, ASA, NSAID since last visit. she plans to make follow up appt with Dr[facility] to work up possible ASA/NSAID allergy further.\par \par COVID vaccine: She is wondering if she should take 3rd dose of Pfizer covid 19 vaccine. she tolerated prior 2 doses uneventfully. she & family continue to practice multi-layered infection mitigation practices.\par \par asthma: she denies wheezing, nocturnal complaints, exercise intolerance. she has not used Albuterol in many months. she's requesting albuterol refill.

## 2021-10-18 NOTE — PHYSICAL EXAM
[Alert] : alert [Normal Voice/Communication] : normal voice communication [Alert, Awake, Oriented as Age-Appropriate] : alert, awake, oriented as age appropriate

## 2021-12-16 ENCOUNTER — APPOINTMENT (OUTPATIENT)
Dept: NEUROLOGY | Facility: CLINIC | Age: 51
End: 2021-12-16
Payer: COMMERCIAL

## 2021-12-16 VITALS
SYSTOLIC BLOOD PRESSURE: 118 MMHG | WEIGHT: 250 LBS | HEIGHT: 66 IN | DIASTOLIC BLOOD PRESSURE: 70 MMHG | BODY MASS INDEX: 40.18 KG/M2

## 2021-12-16 DIAGNOSIS — G62.0 DRUG-INDUCED POLYNEUROPATHY: ICD-10-CM

## 2021-12-16 PROCEDURE — 99214 OFFICE O/P EST MOD 30 MIN: CPT

## 2022-02-04 ENCOUNTER — RX RENEWAL (OUTPATIENT)
Age: 52
End: 2022-02-04

## 2022-02-09 ENCOUNTER — TRANSCRIPTION ENCOUNTER (OUTPATIENT)
Age: 52
End: 2022-02-09

## 2022-02-09 ENCOUNTER — APPOINTMENT (OUTPATIENT)
Dept: FAMILY MEDICINE | Facility: CLINIC | Age: 52
End: 2022-02-09
Payer: COMMERCIAL

## 2022-02-09 VITALS
DIASTOLIC BLOOD PRESSURE: 70 MMHG | HEART RATE: 91 BPM | SYSTOLIC BLOOD PRESSURE: 130 MMHG | BODY MASS INDEX: 40.5 KG/M2 | WEIGHT: 252 LBS | HEIGHT: 66 IN

## 2022-02-09 DIAGNOSIS — E66.9 OBESITY, UNSPECIFIED: ICD-10-CM

## 2022-02-09 PROCEDURE — 99213 OFFICE O/P EST LOW 20 MIN: CPT

## 2022-02-09 RX ORDER — PANTOPRAZOLE 20 MG/1
20 TABLET, DELAYED RELEASE ORAL
Qty: 30 | Refills: 0 | Status: DISCONTINUED | COMMUNITY
Start: 2019-10-18 | End: 2022-02-09

## 2022-02-09 RX ORDER — CEFDINIR 300 MG/1
300 CAPSULE ORAL
Qty: 28 | Refills: 0 | Status: DISCONTINUED | COMMUNITY
Start: 2020-12-07 | End: 2022-02-09

## 2022-02-09 RX ORDER — PANTOPRAZOLE SODIUM 40 MG/1
40 GRANULE, DELAYED RELEASE ORAL
Qty: 30 | Refills: 0 | Status: DISCONTINUED | COMMUNITY
End: 2022-02-09

## 2022-02-09 RX ORDER — FAMOTIDINE 20 MG/1
20 TABLET, FILM COATED ORAL
Refills: 0 | Status: ACTIVE | COMMUNITY

## 2022-02-09 RX ORDER — MELATONIN 5 MG
5 CAPSULE ORAL
Refills: 0 | Status: ACTIVE | COMMUNITY
Start: 2021-01-29

## 2022-02-21 PROBLEM — E66.9 OBESITY: Status: ACTIVE | Noted: 2022-02-09

## 2022-02-21 NOTE — HISTORY OF PRESENT ILLNESS
[FreeTextEntry1] : weight [de-identified] :  IS A PLEASANT 50 YO PRESENTING FOR FOLLOW-UP.  HISTORY OF OBESITY.  HAS HAD DIFFICULTY LOSING WEIGHT.  DID WEIGHT WATCHERS FOR 6 MONTHS. NO SIGNIFICANT CHANGE IN WEIGHT.  THE LAST MONTH EATING VERY HEALTHY.  LIMITING CARBOHYDRATE INTAKE  IS SWIMMING 2 - 3 TIMES A WEEK WITH .  PREVIOUSLY SAW NUTRITIONIST.  WATCHES PORTIONS.  FOLLOWS AT [facility] FOR HISTORY OF BREAST CANCER.  SOMETIMES HAS TROUBLE SLEEPING AT NIGHT.  HAS NOT HAD A PRIOR SLEEP APNEA EVALUATION.

## 2022-02-21 NOTE — PHYSICAL EXAM
[No Acute Distress] : no acute distress [Well Nourished] : well nourished [Well-Appearing] : well-appearing [Normal Sclera/Conjunctiva] : normal sclera/conjunctiva [PERRL] : pupils equal round and reactive to light [No Respiratory Distress] : no respiratory distress  [No Accessory Muscle Use] : no accessory muscle use [Clear to Auscultation] : lungs were clear to auscultation bilaterally [Regular Rhythm] : with a regular rhythm [Normal Rate] : normal rate  [Normal S1, S2] : normal S1 and S2 [Speech Grossly Normal] : speech grossly normal [Memory Grossly Normal] : memory grossly normal [Normal Mood] : the mood was normal

## 2022-02-21 NOTE — PLAN
[FreeTextEntry1] : HEALTHY DIET AND LIFESTYLE MODIFICATIONS\par HEALTHY WEIGHT LOSS \par REFERRAL TO WEIGHT LOSS PHYSICIAN DR. BOURNE\par SLEEP APNEA EVALUATION\par WILL CHECK LAB WORK\par FOLLOW-UP FOR CPE\par FOLLOW-UP ALL SPECIALISTS AS DIRECTED \par CALL WITH ANY QUESTIONS, CONCERNS OR CHANGES

## 2022-03-15 ENCOUNTER — NON-APPOINTMENT (OUTPATIENT)
Age: 52
End: 2022-03-15

## 2022-03-15 ENCOUNTER — APPOINTMENT (OUTPATIENT)
Dept: PULMONOLOGY | Facility: CLINIC | Age: 52
End: 2022-03-15
Payer: COMMERCIAL

## 2022-03-15 VITALS
HEIGHT: 66 IN | WEIGHT: 252 LBS | RESPIRATION RATE: 16 BRPM | OXYGEN SATURATION: 97 % | DIASTOLIC BLOOD PRESSURE: 72 MMHG | BODY MASS INDEX: 40.5 KG/M2 | HEART RATE: 90 BPM | SYSTOLIC BLOOD PRESSURE: 132 MMHG

## 2022-03-15 PROCEDURE — 99204 OFFICE O/P NEW MOD 45 MIN: CPT

## 2022-03-15 RX ORDER — LEVOCETIRIZINE DIHYDROCHLORIDE 5 MG/1
5 TABLET ORAL
Qty: 120 | Refills: 1 | Status: ACTIVE | COMMUNITY
Start: 2020-11-20

## 2022-03-15 RX ORDER — EPINEPHRINE 0.3 MG/.3ML
0.3 INJECTION INTRAMUSCULAR
Qty: 2 | Refills: 3 | Status: ACTIVE | COMMUNITY
Start: 2019-10-04

## 2022-03-15 NOTE — HISTORY OF PRESENT ILLNESS
[Former] : former [Obstructive Sleep Apnea] : obstructive sleep apnea [Awakes Unrefreshed] : awakes unrefreshed [Snoring] : snoring [TextBox_4] : 51-year-old former smoker with a history of asthma since age 19 intermittent in character seen today for worsening symptoms.  Patient states that her asthma would only exacerbate during the spring and easily treated with as needed albuterol.  She uses her albuterol prior to recent events only several times per month.  Approximately a month and a half ago patient began swimming in an indoor pool and noted significant worsening to her asthma including shortness of breath and chest heaviness during her swim.  She did have a response to short acting bronchodilators and has had no significant worsening to her symptoms outside of the pool area.  She swims approximately 1 mile 3 times a week.  History is positive for reflux as well as sinusitis treated with azelastine.  No other new exposures except for an indoor seed garden. [TextBox_11] : 1 [TextBox_13] : 10 [YearQuit] : 2004 [Awakes with Dry Mouth] : does not awaken with dry mouth [Awakes with Headache] : does not awaken with headache [Witnessed Apneas] : no witnessed apneas [TextBox_77] : 1532 [TextBox_79] : 6472 [TextBox_81] : 10 [TextBox_89] : 1 [ESS] : 1

## 2022-03-15 NOTE — PHYSICAL EXAM
[No Acute Distress] : no acute distress [Normal Oropharynx] : normal oropharynx [I] : Mallampati Class: I [Normal Appearance] : normal appearance [Neck Circumference: ___] : neck circumference: [unfilled] [No Neck Mass] : no neck mass [Normal Rate/Rhythm] : normal rate/rhythm [Normal S1, S2] : normal s1, s2 [No Murmurs] : no murmurs [No Resp Distress] : no resp distress [Clear to Auscultation Bilaterally] : clear to auscultation bilaterally [No Abnormalities] : no abnormalities [Benign] : benign [Normal Gait] : normal gait [No Clubbing] : no clubbing [No Cyanosis] : no cyanosis [No Edema] : no edema [FROM] : FROM [Normal Color/ Pigmentation] : normal color/ pigmentation [No Focal Deficits] : no focal deficits [Oriented x3] : oriented x3 [Normal Affect] : normal affect

## 2022-03-15 NOTE — DISCUSSION/SUMMARY
[FreeTextEntry1] : 51-year-old female seen today for the above.  Patient's complaints appear to be secondary to decompensated asthma secondary to exposure to chlorinated pool water.  I am adding an inhaled corticosteroid in hopes of alleviating the patient's complaints.  In addition I have encouraged her to continue her activity in hopes of decreasing her weight.  Patient also has findings consistent with obstructive sleep apnea.  In light of possible considerations for bariatric surgery patient will undergo a home sleep study

## 2022-03-16 ENCOUNTER — TRANSCRIPTION ENCOUNTER (OUTPATIENT)
Age: 52
End: 2022-03-16

## 2022-03-23 ENCOUNTER — APPOINTMENT (OUTPATIENT)
Dept: FAMILY MEDICINE | Facility: CLINIC | Age: 52
End: 2022-03-23
Payer: COMMERCIAL

## 2022-03-23 VITALS
HEART RATE: 85 BPM | DIASTOLIC BLOOD PRESSURE: 80 MMHG | WEIGHT: 254 LBS | BODY MASS INDEX: 40.82 KG/M2 | HEIGHT: 66 IN | SYSTOLIC BLOOD PRESSURE: 130 MMHG

## 2022-03-23 DIAGNOSIS — Z83.3 FAMILY HISTORY OF DIABETES MELLITUS: ICD-10-CM

## 2022-03-23 PROCEDURE — 99396 PREV VISIT EST AGE 40-64: CPT | Mod: 25

## 2022-03-23 PROCEDURE — G0444 DEPRESSION SCREEN ANNUAL: CPT | Mod: 59

## 2022-03-23 NOTE — HISTORY OF PRESENT ILLNESS
[FreeTextEntry1] : physical [de-identified] : MS. MARTIN IS A PLEASANT 50 YO PRESENTING FOR YEARLY CPE.  SHE SAW ENDOCRINOLOGY FOR EVALUATION OF OBESITY AS WELL AS PULMONOLOGY.  TO HAVE SLEEP STUDY.  CARDIOLOGY SEEN 6 MONTHS AGO.  HAD EKG.  [place] [facility] [place]

## 2022-03-23 NOTE — HEALTH RISK ASSESSMENT
[Very Good] : ~his/her~ current health as very good [Good] : ~his/her~  mood as  good [Former] : Former [5-9] : 5-9 [Yes] : Yes [Monthly or less (1 pt)] : Monthly or less (1 point) [1 or 2 (0 pts)] : 1 or 2 (0 points) [Never (0 pts)] : Never (0 points) [No] : In the past 12 months have you used drugs other than those required for medical reasons? No [0] : 2) Feeling down, depressed, or hopeless: Not at all (0) [PHQ-2 Negative - No further assessment needed] : PHQ-2 Negative - No further assessment needed [HIV Test offered] : HIV Test offered [Hepatitis C test offered] : Hepatitis C test offered [None] : None [With Family] : lives with family [# of Members in Household ___] :  household currently consist of [unfilled] member(s) [Employed] : employed [College] : College [] :  [# Of Children ___] : has [unfilled] children [Sexually Active] : sexually active [Feels Safe at Home] : Feels safe at home [Reports normal functional visual acuity (ie: able to read med bottle)] : Reports normal functional visual acuity [Smoke Detector] : smoke detector [Carbon Monoxide Detector] : carbon monoxide detector [Safety elements used in home] : safety elements used in home [Seat Belt] :  uses seat belt [Sunscreen] : uses sunscreen [With Patient/Caregiver] : , with patient/caregiver [Designated Healthcare Proxy] : Designated healthcare proxy [Name: ___] : Health Care Proxy's Name: [unfilled]  [Relationship: ___] : Relationship: [unfilled] [YearQuit] : 2004 [de-identified] : RARE [Audit-CScore] : 1 [de-identified] : SWIMMING WITH SWIM COUCH, WALKING [de-identified] : PRETTY HEALTHY, CUT DOWN CARBOHYDRATES AND SUGAR [[id number]] : 0 [Change in mental status noted] : No change in mental status noted [Language] : denies difficulty with language [Learning/Retaining New Information] : denies difficulty learning/retaining new information [Handling Complex Tasks] : denies difficulty handling complex tasks [High Risk Behavior] : no high risk behavior [Reports changes in vision] : Reports no changes in vision [Reports changes in dental health] : Reports no changes in dental health [MammogramComments] : BILATERAL MASTECTOMY [ColonoscopyDate] : 01/19 [ColonoscopyComments] : YANET [de-identified] : RIGHT HEARING LOSS - SAW ENT [AdvancecareDate] : 03/22

## 2022-03-23 NOTE — PLAN
[FreeTextEntry1] : VISION SCREENING\par SAFETY / HEALTHY HABITS REVIEWED\par HEALTHY DIET AND LIFESTYLE MODIFICATIONS\par HEALTHY WEIGHT LOSS \par CHECK ROUTINE LAB WORK\par NEED MOST RECENT COLONOSCOPY FROM [facility]\par FOLLOW-UP ALL SPECIALISTS AS DIRECTED \par VACCINATIONS REVIEWED: TDAP, SHINGRIX\par CALL WITH ANY QUESTIONS, CONCERNS OR CHANGES

## 2022-03-23 NOTE — PHYSICAL EXAM
[No Acute Distress] : no acute distress [Well Nourished] : well nourished [Well-Appearing] : well-appearing [Normal Sclera/Conjunctiva] : normal sclera/conjunctiva [PERRL] : pupils equal round and reactive to light [Normal Outer Ear/Nose] : the outer ears and nose were normal in appearance [Normal Oropharynx] : the oropharynx was normal [Normal TMs] : both tympanic membranes were normal [No Lymphadenopathy] : no lymphadenopathy [Supple] : supple [No Respiratory Distress] : no respiratory distress  [No Accessory Muscle Use] : no accessory muscle use [Clear to Auscultation] : lungs were clear to auscultation bilaterally [Normal Rate] : normal rate  [Regular Rhythm] : with a regular rhythm [Normal S1, S2] : normal S1 and S2 [Soft] : abdomen soft [Non Tender] : non-tender [Normal Bowel Sounds] : normal bowel sounds [No Joint Swelling] : no joint swelling [Grossly Normal Strength/Tone] : grossly normal strength/tone [Coordination Grossly Intact] : coordination grossly intact [No Focal Deficits] : no focal deficits [Normal Gait] : normal gait [Deep Tendon Reflexes (DTR)] : deep tendon reflexes were 2+ and symmetric [Speech Grossly Normal] : speech grossly normal [Memory Grossly Normal] : memory grossly normal [Normal Mood] : the mood was normal

## 2022-03-29 ENCOUNTER — APPOINTMENT (OUTPATIENT)
Age: 52
End: 2022-03-29
Payer: COMMERCIAL

## 2022-03-29 ENCOUNTER — OUTPATIENT (OUTPATIENT)
Dept: OUTPATIENT SERVICES | Facility: HOSPITAL | Age: 52
LOS: 1 days | End: 2022-03-29
Payer: COMMERCIAL

## 2022-03-29 DIAGNOSIS — Z90.13 ACQUIRED ABSENCE OF BILATERAL BREASTS AND NIPPLES: Chronic | ICD-10-CM

## 2022-03-29 DIAGNOSIS — G47.33 OBSTRUCTIVE SLEEP APNEA (ADULT) (PEDIATRIC): ICD-10-CM

## 2022-03-29 DIAGNOSIS — Z90.5 ACQUIRED ABSENCE OF KIDNEY: Chronic | ICD-10-CM

## 2022-03-29 DIAGNOSIS — D30.02 BENIGN NEOPLASM OF LEFT KIDNEY: Chronic | ICD-10-CM

## 2022-03-29 DIAGNOSIS — Z98.890 OTHER SPECIFIED POSTPROCEDURAL STATES: Chronic | ICD-10-CM

## 2022-03-29 PROCEDURE — 95800 SLP STDY UNATTENDED: CPT

## 2022-04-22 ENCOUNTER — TRANSCRIPTION ENCOUNTER (OUTPATIENT)
Age: 52
End: 2022-04-22

## 2022-05-02 ENCOUNTER — APPOINTMENT (OUTPATIENT)
Dept: FAMILY MEDICINE | Facility: CLINIC | Age: 52
End: 2022-05-02
Payer: COMMERCIAL

## 2022-05-02 VITALS
BODY MASS INDEX: 41.14 KG/M2 | DIASTOLIC BLOOD PRESSURE: 76 MMHG | HEART RATE: 88 BPM | OXYGEN SATURATION: 96 % | WEIGHT: 256 LBS | HEIGHT: 66 IN | SYSTOLIC BLOOD PRESSURE: 128 MMHG

## 2022-05-02 DIAGNOSIS — R10.13 EPIGASTRIC PAIN: ICD-10-CM

## 2022-05-02 DIAGNOSIS — R74.01 ELEVATION OF LEVELS OF LIVER TRANSAMINASE LEVELS: ICD-10-CM

## 2022-05-02 PROCEDURE — 99214 OFFICE O/P EST MOD 30 MIN: CPT | Mod: 25

## 2022-05-02 PROCEDURE — 36415 COLL VENOUS BLD VENIPUNCTURE: CPT

## 2022-05-03 LAB
ALBUMIN SERPL ELPH-MCNC: 4.4 G/DL
ALP BLD-CCNC: 86 U/L
ALT SERPL-CCNC: 31 U/L
AMYLASE/CREAT SERPL: 42 U/L
ANION GAP SERPL CALC-SCNC: 13 MMOL/L
APPEARANCE: CLEAR
AST SERPL-CCNC: 21 U/L
BILIRUB SERPL-MCNC: 0.2 MG/DL
BILIRUBIN URINE: NEGATIVE
BLOOD URINE: NEGATIVE
BUN SERPL-MCNC: 14 MG/DL
CALCIUM SERPL-MCNC: 9.2 MG/DL
CHLORIDE SERPL-SCNC: 104 MMOL/L
CO2 SERPL-SCNC: 24 MMOL/L
COLOR: NORMAL
CREAT SERPL-MCNC: 0.82 MG/DL
EGFR: 86 ML/MIN/1.73M2
GLUCOSE QUALITATIVE U: NEGATIVE
GLUCOSE SERPL-MCNC: 95 MG/DL
HAV IGM SER QL: NONREACTIVE
HBV CORE IGM SER QL: NONREACTIVE
HBV SURFACE AG SER QL: NONREACTIVE
HCV AB SER QL: NONREACTIVE
HCV S/CO RATIO: 0.15 S/CO
KETONES URINE: NEGATIVE
LEUKOCYTE ESTERASE URINE: NEGATIVE
LPL SERPL-CCNC: 41 U/L
NITRITE URINE: NEGATIVE
PH URINE: 7.5
POTASSIUM SERPL-SCNC: 4.8 MMOL/L
PROT SERPL-MCNC: 7.4 G/DL
PROTEIN URINE: NEGATIVE
SODIUM SERPL-SCNC: 140 MMOL/L
SPECIFIC GRAVITY URINE: 1.02
UROBILINOGEN URINE: NORMAL

## 2022-05-04 ENCOUNTER — APPOINTMENT (OUTPATIENT)
Dept: PULMONOLOGY | Facility: CLINIC | Age: 52
End: 2022-05-04
Payer: COMMERCIAL

## 2022-05-04 PROCEDURE — 99214 OFFICE O/P EST MOD 30 MIN: CPT | Mod: 95

## 2022-05-04 NOTE — HISTORY OF PRESENT ILLNESS
[Medical Office: ([facility])___] : at the medical office located in  [Verbal consent obtained from patient] : the patient, [unfilled] [Intermittent] : intermittent [Doing Well] : doing well [Well Controlled] : Well controlled [None] : The patient is currently asymptomatic [___ Times a Week] : of [unfilled] time(s) a week [Adherent] : the patient is adherent with ~his/her~ medication regimen [Obstructive Sleep Apnea] : obstructive sleep apnea [Awakes Unrefreshed] : awakes unrefreshed [Snoring] : snoring [Home] : home [More Frequent Use Needed Recently] : normal [de-identified] : off arnuity [TextBox_11] : 1 [TextBox_13] : 10 [YearQuit] : 2004 [Awakes with Dry Mouth] : does not awaken with dry mouth [Awakes with Headache] : does not awaken with headache [Witnessed Apneas] : no witnessed apneas [TextBox_77] : 1098 [TextBox_79] : 1971 [TextBox_81] : 10 [TextBox_89] : 1 [TextBox_100] : 3/29/22 [TextBox_108] : 7.1 [TextBox_112] : 1 min [TextBox_116] : 83

## 2022-05-04 NOTE — DISCUSSION/SUMMARY
[Asthma] : asthma [Stable] : stable [Intermittent] : intermittent [PFTs] : pulmonary function tests [None] : There are no changes in medication management [FreeTextEntry1] : No evidence of significant LESLIE or EDS

## 2022-05-08 NOTE — PHYSICAL EXAM
[No Acute Distress] : no acute distress [Well Nourished] : well nourished [Well-Appearing] : well-appearing [Normal Sclera/Conjunctiva] : normal sclera/conjunctiva [PERRL] : pupils equal round and reactive to light [No Respiratory Distress] : no respiratory distress  [No Accessory Muscle Use] : no accessory muscle use [Clear to Auscultation] : lungs were clear to auscultation bilaterally [Normal Rate] : normal rate  [Regular Rhythm] : with a regular rhythm [Normal S1, S2] : normal S1 and S2 [Soft] : abdomen soft [Non Tender] : non-tender [Normal Bowel Sounds] : normal bowel sounds [No Joint Swelling] : no joint swelling [Grossly Normal Strength/Tone] : grossly normal strength/tone

## 2022-05-08 NOTE — PLAN
[FreeTextEntry1] : RECENT LAB WORK REVIEWED\par ABDOMINAL SONOGRAM\par CHECK LAB WORK\par GI FOLLOW-UP FOR POSSIBLE ENDOSCOPY\par GERD LIFESTYLE MODIFICATIONS REVIEWED; ELEVATE HOB, AVOID EATING 3 - 4 HOURS PRIOR TO BEDTIME, MONITOR FOR FOOD TRIGGERS AND AVOID \par AVOIDANCE OF NSAIDS\par WILL CHECK URINE CULTURE AND FOLLOW-UP MICROSCOPIC HEMATURIA AT [facility]\par PRIOR IMAGING AND NOTES REVIEWED\par CALL WITH ANY QUESTIONS, CONCERNS OR CHANGES

## 2022-05-08 NOTE — HISTORY OF PRESENT ILLNESS
[FreeTextEntry1] : FOLLOW-UP LAB WORK [de-identified] : MS. MARTIN IS A PLEASANT 52 YO PRESENTING FOR FOLLOW-UP OF LAB WORK.  HAS HAD NO TYLENOL IN A WEEK AND NO ALCOHOL.  NO KNOWN PRIOR ELEVATED TRANSAMINASES.  [place] [facility] [place] [facility].  HAS HAD EPIGASTRIC PAIN THAT COMES AND GOES.  STARTED THE PAST MONTH OR TWO.  NO N/V/D.  NO BLOOD IN STOOL.  NOT RELATED TO FOOD.  [place] [facility] [place].  DUE END OF THE YEAR.  DOES GET REFLUX.  LAST ENDOSCOPY A COUPLE YEARS AGO.  SHOWED SIGNS OF GERD.  TAKES FAMOTIDINE AND ANOTHER REFLUX MEDICATION AS NEEDED.  DENIES URINARY COMPLAINTS.  NO D/H/F OR HEMATURIA.

## 2022-05-19 ENCOUNTER — APPOINTMENT (OUTPATIENT)
Dept: PEDIATRIC ALLERGY IMMUNOLOGY | Facility: CLINIC | Age: 52
End: 2022-05-19

## 2022-06-02 ENCOUNTER — APPOINTMENT (OUTPATIENT)
Dept: PEDIATRIC ALLERGY IMMUNOLOGY | Facility: CLINIC | Age: 52
End: 2022-06-02
Payer: COMMERCIAL

## 2022-06-02 DIAGNOSIS — Z88.0 ALLERGY STATUS TO PENICILLIN: ICD-10-CM

## 2022-06-02 PROCEDURE — 99442: CPT

## 2022-06-02 NOTE — HISTORY OF PRESENT ILLNESS
[de-identified] : 52 y/o F with multiple medical issues including dermatographism, hives, drug allergies. last seen by Dr Thapa 10/2021, presents to discuss her drug allergies. She is followed in the hospital for special surgery and was told to possibly try meloxicam.  \par \par History of DRUG REACTIONS:\par - pcn- ~2005, hives on arms and tingling of upper extremity. Shortly after taking a dose. 5-6 days into course.\par - contact sinus- lip swelling in childhood\par - Levaquin- Rash at injection site at 2 separate times. Can tolerate oral Levaquin.\par - Aspirin- in 8/ 2018 Hives, had some of swelling of lips and tongue within 30 minutes of Aspirin ingestion on two separate occasions after hip replacement. She also used short acting bronchodilator for SOB. \par \par URTICARIA: \par - She has been taking Levocetirizine 5 mg qHs and Montelukast every day since 2018, PRN Hydroxyzine \par - She currently has poison ivy and has been taking extra Hydroxyzine \par - Started after hip replacement and possible reaction to Aspirin.  She took Omalizumab for about a year that resolved her urticarial symptoms. She stopped Omalizumab about 2 years ago. In the last 2 years she had an\par - She received Pfizer-BioNTech COVID-19 Vaccine #3 and hasn't had any issues. \par - labs in  2018 - negative anti-IgE receptor Ab, anti-thyroid Abs, antimitochondrial Abs.\par \par \par \par HISTORY: \par She states she is currently feeling well. She hasn't had any hives for a few months but still has itchiness. She is taking levocetirizine 5mg daily, Atarax 50mg QHS, and Singulair 10 mg QHS. Approximately 2 weeks ago, she had a sinus infection. this was the 1st time she had hives in a long time. She is wondering if she should do further work up for autoimmunity or other cause of hives. She had evaluation at [facility] a few yrs ago which was reportedly unremarkable. Limited autoimmune work up done with us in 2018 was negative for anti-IgE receptor Ab, anti-thyroid Abs, antimitochondrial Abs.\par \par Allergic rhinitis: ImmunoCAP positive to grass in 2018. she currently denies any nasal or ocular complaints. in addition to above antihistamines and Singulair, she is also using azelastine nose spray prn.\par \par Drug allergy: no exposures to PCN, Levaquin, ASA, NSAID since last visit. she plans to make follow up appt with [facility]. [facility] to work up possible ASA/NSAID allergy further.\par \par COVID vaccine: She is wondering if she should take 3rd dose of Pfizer covid 19 vaccine. she tolerated prior 2 doses uneventfully. she & family continue to practice multi-layered infection mitigation practices.\par \par asthma: she denies wheezing, nocturnal complaints, exercise intolerance. she has not used Albuterol in many months. she's requesting albuterol refill.

## 2022-08-09 ENCOUNTER — RX RENEWAL (OUTPATIENT)
Age: 52
End: 2022-08-09

## 2022-08-30 ENCOUNTER — APPOINTMENT (OUTPATIENT)
Dept: PEDIATRIC ALLERGY IMMUNOLOGY | Facility: CLINIC | Age: 52
End: 2022-08-30

## 2022-08-30 VITALS — DIASTOLIC BLOOD PRESSURE: 78 MMHG | SYSTOLIC BLOOD PRESSURE: 121 MMHG

## 2022-08-30 VITALS
DIASTOLIC BLOOD PRESSURE: 81 MMHG | BODY MASS INDEX: 39.21 KG/M2 | HEIGHT: 66 IN | HEART RATE: 81 BPM | SYSTOLIC BLOOD PRESSURE: 126 MMHG | TEMPERATURE: 96.8 F | OXYGEN SATURATION: 96 % | WEIGHT: 243.99 LBS

## 2022-08-30 DIAGNOSIS — Z88.6 ALLERGY STATUS TO ANALGESIC AGENT: ICD-10-CM

## 2022-08-30 DIAGNOSIS — Z01.89 ENCOUNTER FOR OTHER SPECIFIED SPECIAL EXAMINATIONS: ICD-10-CM

## 2022-08-30 PROCEDURE — 99214 OFFICE O/P EST MOD 30 MIN: CPT

## 2022-09-02 PROBLEM — Z01.89 ENCOUNTER FOR DRUG CHALLENGE: Status: ACTIVE | Noted: 2022-09-02

## 2022-09-02 PROBLEM — Z88.6 ALLERGY TO NSAIDS: Status: ACTIVE | Noted: 2018-10-31

## 2022-09-02 NOTE — HISTORY OF PRESENT ILLNESS
[de-identified] : 52 y/o F with multiple medical issues including chronic urticaria/dermatographism, drug allergies. last seen by Dr Thapa 10/2021, presents to discuss her drug allergies. She is followed in the [facility] for special surgery and was told to possibly try meloxicam.\par = used to take Omalizumab , off since 2019\par \par She had history of hives with Aspirin, needs NSAID for joint pain, presents for topical Diclofenac cream challenge.   \par \par History of DRUG REACTIONS:\par - pcn- ~2005, hives on arms and tingling of upper extremity. Shortly after taking a dose. 5-6 days into course.\par - contact sinus- lip swelling in childhood\par - Levaquin- Rash at injection site at 2 separate times. Can tolerate oral Levaquin.\par - Aspirin- in 8/ 2018 Hives, had some of swelling of lips and tongue within 30 minutes of Aspirin ingestion on two separate occasions after hip replacement. She also used short acting bronchodilator for SOB. \par \par URTICARIA: \par - She has been taking Levocetirizine 5 mg qHs and Montelukast every day since 2018, PRN Hydroxyzine \par - She currently has poison ivy and has been taking extra Hydroxyzine \par - Started after hip replacement and possible reaction to Aspirin.  She took Omalizumab for about a year that resolved her urticarial symptoms. She stopped Omalizumab about 2 years ago. In the last 2 years she had an\par - She received Pfizer-BioNTech COVID-19 Vaccine #3 and hasn't had any issues. \par - labs in  2018 - negative anti-IgE receptor Ab, anti-thyroid Abs, antimitochondrial Abs.\par \par \par \par HISTORY: \par She states she is currently feeling well. She hasn't had any hives for a few months but still has itchiness. She is taking levocetirizine 5mg daily, Atarax 50mg QHS, and Singulair 10 mg QHS. Approximately 2 weeks ago, she had a sinus infection. this was the 1st time she had hives in a long time. She is wondering if she should do further work up for autoimmunity or other cause of hives. She had evaluation at [facility] a few yrs ago which was reportedly unremarkable. Limited autoimmune work up done with us in 2018 was negative for anti-IgE receptor Ab, anti-thyroid Abs, antimitochondrial Abs.\par \par Allergic rhinitis: ImmunoCAP positive to grass in 2018. she currently denies any nasal or ocular complaints. in addition to above antihistamines and Singulair, she is also using azelastine nose spray prn.\par \par Drug allergy: no exposures to PCN, Levaquin, ASA, NSAID since last visit. she plans to make follow up appt with Dr. Fatima [facility] to work up possible ASA/NSAID allergy further.\par \par COVID vaccine: She is wondering if she should take 3rd dose of Pfizer covid 19 vaccine. she tolerated prior 2 doses uneventfully. she & family continue to practice multi-layered infection mitigation practices.\par \par asthma: she denies wheezing, nocturnal complaints, exercise intolerance. she has not used Albuterol in many months. she's requesting albuterol refill.

## 2022-09-02 NOTE — PHYSICAL EXAM
[Alert] : alert [Well Nourished] : well nourished [Healthy Appearance] : healthy appearance [No Acute Distress] : no acute distress [Sclera Not Icteric] : sclera not icteric [Normal Lips/Tongue] : the lips and tongue were normal [No Thrush] : no thrush [No Neck Mass] : no neck mass was observed [Normal Rate and Effort] : normal respiratory rhythm and effort [No Crackles] : no crackles [Bilateral Audible Breath Sounds] : bilateral audible breath sounds [Normal Rate] : heart rate was normal  [Regular Rhythm] : with a regular rhythm [No Rash] : no rash [No clubbing] : no clubbing [No Cyanosis] : no cyanosis [Normal Mood] : mood was normal [Normal Affect] : affect was normal [Alert, Awake, Oriented as Age-Appropriate] : alert, awake, oriented as age appropriate [Conjunctival Erythema] : no conjunctival erythema

## 2022-09-17 ENCOUNTER — NON-APPOINTMENT (OUTPATIENT)
Age: 52
End: 2022-09-17

## 2022-09-20 ENCOUNTER — NON-APPOINTMENT (OUTPATIENT)
Age: 52
End: 2022-09-20

## 2022-10-01 ENCOUNTER — NON-APPOINTMENT (OUTPATIENT)
Age: 52
End: 2022-10-01

## 2022-10-06 RX ORDER — AZELASTINE HYDROCHLORIDE 137 UG/1
0.1 SPRAY, METERED NASAL
Qty: 1 | Refills: 3 | Status: ACTIVE | COMMUNITY
Start: 2018-10-31

## 2022-10-18 ENCOUNTER — APPOINTMENT (OUTPATIENT)
Dept: PEDIATRIC ALLERGY IMMUNOLOGY | Facility: CLINIC | Age: 52
End: 2022-10-18

## 2022-10-18 ENCOUNTER — NON-APPOINTMENT (OUTPATIENT)
Age: 52
End: 2022-10-18

## 2022-10-18 VITALS
OXYGEN SATURATION: 97 % | TEMPERATURE: 96.8 F | SYSTOLIC BLOOD PRESSURE: 117 MMHG | HEART RATE: 77 BPM | BODY MASS INDEX: 39.21 KG/M2 | WEIGHT: 243.99 LBS | HEIGHT: 66 IN | DIASTOLIC BLOOD PRESSURE: 75 MMHG

## 2022-10-18 DIAGNOSIS — T36.0X5A ADVERSE EFFECT OF PENICILLINS, INITIAL ENCOUNTER: ICD-10-CM

## 2022-10-18 DIAGNOSIS — J45.909 UNSPECIFIED ASTHMA, UNCOMPLICATED: ICD-10-CM

## 2022-10-18 DIAGNOSIS — T36.0X5D ADVERSE EFFECT OF PENICILLINS, SUBSEQUENT ENCOUNTER: ICD-10-CM

## 2022-10-18 PROCEDURE — 95018 ALL TSTG PERQ&IQ DRUGS/BIOL: CPT

## 2022-10-18 PROCEDURE — 99214 OFFICE O/P EST MOD 30 MIN: CPT | Mod: 25

## 2022-10-18 PROCEDURE — 94060 EVALUATION OF WHEEZING: CPT

## 2022-10-18 PROCEDURE — 94664 DEMO&/EVAL PT USE INHALER: CPT | Mod: 59

## 2022-10-18 RX ORDER — FLUTICASONE FUROATE 200 UG/1
200 POWDER RESPIRATORY (INHALATION) DAILY
Qty: 1 | Refills: 5 | Status: DISCONTINUED | COMMUNITY
Start: 2022-03-15 | End: 2022-10-18

## 2022-10-18 RX ORDER — CLINDAMYCIN HYDROCHLORIDE 300 MG/1
300 CAPSULE ORAL
Qty: 21 | Refills: 0 | Status: COMPLETED | COMMUNITY
Start: 2021-09-10 | End: 2022-10-18

## 2022-10-18 RX ORDER — NIRMATRELVIR AND RITONAVIR 300-100 MG
20 X 150 MG & KIT ORAL
Qty: 30 | Refills: 0 | Status: COMPLETED | COMMUNITY
Start: 2022-10-02

## 2022-10-21 PROBLEM — T36.0X5D ADVERSE EFFECT OF PENICILLINS, SUBSEQUENT ENCOUNTER: Status: ACTIVE | Noted: 2022-10-21

## 2022-10-21 PROBLEM — T36.0X5A ADVERSE EFFECT OF PENICILLINS, INITIAL ENCOUNTER: Noted: 2018-10-31

## 2022-10-21 NOTE — HISTORY OF PRESENT ILLNESS
[de-identified] : 52 y/o F with multiple medical issues including chronic urticaria/dermatographism, drug allergies, was followed by  Dr Thapa in the past, returns for PCN skin testing.\par She is followed in the hospital for special surgery and was told to possibly try meloxicam.\par = used to take Omalizumab , off since 2019\par = 8/30/2022- negative office challenge to Diclofenac cream. She had history of hives with Aspirin.   \par \par - She has been having more asthma symptoms, currently on Arnuity Ellipta 200 1/day\par \par History of DRUG REACTIONS:\par - pcn- ~2005, hives on arms and tingling of upper extremity. Shortly after taking a dose. 5-6 days into course.\par - contact sinus- lip swelling in childhood\par - Levaquin- Rash at injection site at 2 separate times. Can tolerate oral Levaquin.\par - Aspirin- in 8/ 2018 Hives, had some of swelling of lips and tongue within 30 minutes of Aspirin ingestion on two separate occasions after hip replacement. She also used short acting bronchodilator for SOB. \par \par URTICARIA: \par - She has been taking Levocetirizine 5 mg qHs and Montelukast every day since 2018, PRN Hydroxyzine \par - She currently has poison ivy and has been taking extra Hydroxyzine \par - Started after hip replacement and possible reaction to Aspirin.  She took Omalizumab for about a year that resolved her urticarial symptoms. She stopped Omalizumab about 2 years ago. In the last 2 years she had an\par - She received Pfizer-BioNTech COVID-19 Vaccine #3 and hasn't had any issues. \par - labs in  2018 - negative anti-IgE receptor Ab, anti-thyroid Abs, antimitochondrial Abs.\par \par \par \par HISTORY: \par She states she is currently feeling well. She hasn't had any hives for a few months but still has itchiness. She is taking levocetirizine 5mg daily, Atarax 50mg QHS, and Singulair 10 mg QHS. Approximately 2 weeks ago, she had a sinus infection. this was the 1st time she had hives in a long time. She is wondering if she should do further work up for autoimmunity or other cause of hives. She had evaluation at [facility] a few yrs ago which was reportedly unremarkable. Limited autoimmune work up done with us in 2018 was negative for anti-IgE receptor Ab, anti-thyroid Abs, antimitochondrial Abs.\par \par Allergic rhinitis: ImmunoCAP positive to grass in 2018. she currently denies any nasal or ocular complaints. in addition to above antihistamines and Singulair, she is also using azelastine nose spray prn.\par \par Drug allergy: no exposures to PCN, Levaquin, ASA, NSAID since last visit. she plans to make follow up appt with [facility] to work up possible ASA/NSAID allergy further.\par \par COVID vaccine: She is wondering if she should take 3rd dose of Pfizer covid 19 vaccine. she tolerated prior 2 doses uneventfully. she & family continue to practice multi-layered infection mitigation practices.\par \par asthma: she denies wheezing, nocturnal complaints, exercise intolerance. she has not used Albuterol in many months. she's requesting albuterol refill. [Shortness of Breath] : shortness of breath [Dyspnea on Exertion] : dyspnea on exertion [> or = 2 x/wk] : > than or = 2 x/week [Minor Limitation] : minor limitation [Daily] : daily [FreeTextEntry7] : 14

## 2022-10-21 NOTE — PHYSICAL EXAM
[Alert] : alert [Well Nourished] : well nourished [Healthy Appearance] : healthy appearance [No Acute Distress] : no acute distress [No Discharge] : no discharge [Sclera Not Icteric] : sclera not icteric [Conjunctival Erythema] : no conjunctival erythema [Pharyngeal erythema] : no pharyngeal erythema [Exudate] : no exudate [Normal Rate and Effort] : normal respiratory rhythm and effort [No Crackles] : no crackles [Bilateral Audible Breath Sounds] : bilateral audible breath sounds [Wheezing] : no wheezing was heard [Normal Rate] : heart rate was normal  [Regular Rhythm] : with a regular rhythm [No Rash] : no rash [Urticaria] : no urticaria [No clubbing] : no clubbing [No Cyanosis] : no cyanosis [Normal Mood] : mood was normal [Normal Affect] : affect was normal [Alert, Awake, Oriented as Age-Appropriate] : alert, awake, oriented as age appropriate

## 2022-12-15 ENCOUNTER — APPOINTMENT (OUTPATIENT)
Dept: NEUROLOGY | Facility: CLINIC | Age: 52
End: 2022-12-15

## 2022-12-15 VITALS
HEIGHT: 67 IN | BODY MASS INDEX: 36.1 KG/M2 | SYSTOLIC BLOOD PRESSURE: 118 MMHG | DIASTOLIC BLOOD PRESSURE: 70 MMHG | WEIGHT: 230 LBS

## 2022-12-15 PROCEDURE — 99214 OFFICE O/P EST MOD 30 MIN: CPT

## 2022-12-15 NOTE — HISTORY OF PRESENT ILLNESS
[FreeTextEntry1] : I have been following her for a number of years for peripheral neuropathy felt related to prior chemotherapy for breast cancer. She is on gabapentin 300 mg 3 times a day with excellent control of her neuropathic pain.\par \par She also some chronic back pain and uses an occasional Flexeril.\par \par She is S/P  left hip replacement.\par \par Says she is in a clinical trial through [facility] using yoga to help with peripheral neuropathy symptoms

## 2022-12-15 NOTE — PHYSICAL EXAM
[FreeTextEntry1] : \par Mental status: Alert, fully oriented, speech comprehension intact, recent memory intact\par \par Cranial nerves: No ptosis  Extraocular movements full. Facial strength and sensation are normal. Tongue midline.\par \par Motor: Strength  normal throughout. There is no drift.  No abnormal movements observed.\par \par Sensation: Intact to Pin, proprioception, light touch\par \par Coordination: Finger-nose intact\par \par Reflexes Absent\par \par Gait  normal. Romberg absent.\par \par No lumbar palpation tenderness.  Full range of movement of the lumbar spine\par \par \par

## 2022-12-15 NOTE — ASSESSMENT
[FreeTextEntry1] : Peripheral neuropathy. Chronic back pain.\par \par Clinically stable. Will renew gabapentin 300 mg 3 times a day, Flexeril as needed.\par \par Since her back is starting to act up I have also given her a referral for some physical therapy.\par \par Followup in one year, sooner should the need arise.

## 2022-12-30 ENCOUNTER — NON-APPOINTMENT (OUTPATIENT)
Age: 52
End: 2022-12-30

## 2023-01-03 ENCOUNTER — NON-APPOINTMENT (OUTPATIENT)
Age: 53
End: 2023-01-03

## 2023-01-17 ENCOUNTER — NON-APPOINTMENT (OUTPATIENT)
Age: 53
End: 2023-01-17

## 2023-01-30 ENCOUNTER — RX RENEWAL (OUTPATIENT)
Age: 53
End: 2023-01-30

## 2023-02-21 ENCOUNTER — APPOINTMENT (OUTPATIENT)
Dept: DERMATOLOGY | Facility: CLINIC | Age: 53
End: 2023-02-21
Payer: COMMERCIAL

## 2023-02-21 PROCEDURE — 99203 OFFICE O/P NEW LOW 30 MIN: CPT

## 2023-03-09 ENCOUNTER — NON-APPOINTMENT (OUTPATIENT)
Age: 53
End: 2023-03-09

## 2023-03-09 ENCOUNTER — APPOINTMENT (OUTPATIENT)
Dept: FAMILY MEDICINE | Facility: CLINIC | Age: 53
End: 2023-03-09
Payer: COMMERCIAL

## 2023-03-09 VITALS
SYSTOLIC BLOOD PRESSURE: 120 MMHG | WEIGHT: 245 LBS | OXYGEN SATURATION: 98 % | HEIGHT: 67 IN | DIASTOLIC BLOOD PRESSURE: 74 MMHG | BODY MASS INDEX: 38.45 KG/M2 | HEART RATE: 82 BPM

## 2023-03-09 DIAGNOSIS — R22.40 LOCALIZED SWELLING, MASS AND LUMP, UNSPECIFIED LOWER LIMB: ICD-10-CM

## 2023-03-09 DIAGNOSIS — M79.89 OTHER SPECIFIED SOFT TISSUE DISORDERS: ICD-10-CM

## 2023-03-09 PROCEDURE — 99214 OFFICE O/P EST MOD 30 MIN: CPT | Mod: 25

## 2023-03-09 PROCEDURE — 36415 COLL VENOUS BLD VENIPUNCTURE: CPT

## 2023-03-14 LAB
ALBUMIN SERPL ELPH-MCNC: 4.5 G/DL
ALP BLD-CCNC: 93 U/L
ALT SERPL-CCNC: 28 U/L
ANION GAP SERPL CALC-SCNC: 15 MMOL/L
AST SERPL-CCNC: 18 U/L
BACTERIA THROAT CULT: NORMAL
BILIRUB SERPL-MCNC: <0.2 MG/DL
BUN SERPL-MCNC: 22 MG/DL
CALCIUM SERPL-MCNC: 9.8 MG/DL
CHLORIDE SERPL-SCNC: 102 MMOL/L
CO2 SERPL-SCNC: 25 MMOL/L
CREAT SERPL-MCNC: 0.85 MG/DL
EGFR: 82 ML/MIN/1.73M2
GLUCOSE SERPL-MCNC: 102 MG/DL
POTASSIUM SERPL-SCNC: 4.8 MMOL/L
PROT SERPL-MCNC: 7.9 G/DL
SODIUM SERPL-SCNC: 142 MMOL/L

## 2023-03-15 LAB
BASOPHILS # BLD AUTO: 0.08 K/UL
BASOPHILS NFR BLD AUTO: 0.7 %
EOSINOPHIL # BLD AUTO: 0.24 K/UL
EOSINOPHIL NFR BLD AUTO: 2 %
HCT VFR BLD CALC: 44.2 %
HGB BLD-MCNC: 14.3 G/DL
IMM GRANULOCYTES NFR BLD AUTO: 0.4 %
LYMPHOCYTES # BLD AUTO: 3.58 K/UL
LYMPHOCYTES NFR BLD AUTO: 29.6 %
MAN DIFF?: NORMAL
MCHC RBC-ENTMCNC: 28.8 PG
MCHC RBC-ENTMCNC: 32.4 GM/DL
MCV RBC AUTO: 89.1 FL
MONOCYTES # BLD AUTO: 0.79 K/UL
MONOCYTES NFR BLD AUTO: 6.5 %
NEUTROPHILS # BLD AUTO: 7.37 K/UL
NEUTROPHILS NFR BLD AUTO: 60.8 %
PLATELET # BLD AUTO: 310 K/UL
RBC # BLD: 4.96 M/UL
RBC # FLD: 13.9 %
WBC # FLD AUTO: 12.11 K/UL

## 2023-03-26 PROBLEM — M79.89 LEG SWELLING: Status: ACTIVE | Noted: 2023-03-09

## 2023-03-26 PROBLEM — R22.40 SKIN LUMP OF LEG: Status: ACTIVE | Noted: 2023-03-09

## 2023-03-26 NOTE — PHYSICAL EXAM
[No Acute Distress] : no acute distress [Well Nourished] : well nourished [Well-Appearing] : well-appearing [Normal Sclera/Conjunctiva] : normal sclera/conjunctiva [PERRL] : pupils equal round and reactive to light [Normal Outer Ear/Nose] : the outer ears and nose were normal in appearance [Normal Oropharynx] : the oropharynx was normal [Normal TMs] : both tympanic membranes were normal [Supple] : supple [No Accessory Muscle Use] : no accessory muscle use [No Respiratory Distress] : no respiratory distress  [Clear to Auscultation] : lungs were clear to auscultation bilaterally [Normal Rate] : normal rate  [Regular Rhythm] : with a regular rhythm [Normal S1, S2] : normal S1 and S2 [de-identified] : small lump above left ankle inner.  bruise like appearance. pulses intact.

## 2023-03-26 NOTE — PLAN
[FreeTextEntry1] : RX FOR VENOUS DUPLEX AND SONOGRAM AND SKIN LUMP\par WARM COMPRESSES\par CHECK LAB WORK\par WILL PERFORM THROAT CULTURE\par RX CLINDAMYCIN - TO EAT WITH MEDICATION\par ENT\par CALL WITH ANY QUESTIONS, CONCERNS OR CHANGES OR IF SYMPTOMS PERSIST/WORSEN

## 2023-03-26 NOTE — REVIEW OF SYSTEMS
[Negative] : Respiratory [FreeTextEntry4] : SEE HPI [FreeTextEntry9] : SEE HPI [de-identified] : SEE HPI

## 2023-03-26 NOTE — HISTORY OF PRESENT ILLNESS
[FreeTextEntry8] : MS. MARTIN IS A PLEASANT 53 YO PRESENTING FOR ACUTE VISIT.  HER LEFT ANKLE AREA HAD A REDNESS.  SAW DERMATOLOGY FOR EVALUATION OF IT THREE WEEKS AGO.  GIVEN A CREAM TO USE.  DISCUSSED AT [facility] AS WELL.  WAS ADVISED TO TAKE A WATER PILL - HASN'T HELPED.  MORE RED AFTER BATHING.  NO INJURY.  NO SWELLING NOW.  HAS HAD NO SIGNIFICANT SWELLING.  IS IRRITATED.  SAW DERMATOLOGY DR. HAILE. ALSO NOTES THAT FOR THE PAST TWO WEEKS HAS HAD A SORE THROAT AND LEFT EAR DISCOMFORT.  NO COUGH OR CONGESTION.  NO FEVER.  IS OTHERWISE FEELING OKAY.

## 2023-04-11 NOTE — ED ADULT NURSE NOTE - P[facility]
CHIEF COMPLAINT:  Office Visit (Cold symptoms, eye swelling and discharge  ear pain)      HISTORY OF PRESENT ILLNESS: Kassy is a patient of Dr. Zelaya, who presents today C/O a 1.5 week history of cold symptoms, including nasal congestion, yellow nasal discharge, left maxillary sinus pain and pressure,  feeling fatigued.  Over the last couple of  days, she has developed left ear pain and left eye swelling and discharge that is thick and yellow.  She has been using warm compresses on the eye, which is helping a little.     Denies coughing, SOB, fever, chills, N/V/D.      She also taken OTC Ibuprofen.          ALLERGIES:  No Known Allergies    MEDICATIONS:  Current Outpatient Medications   Medication Sig Dispense Refill   • DULoxetine (CYMBALTA) 30 MG capsule Take 30 mg by mouth daily.     • DULoxetine (CYMBALTA) 60 MG capsule Take 60 mg by mouth daily. Along with 30mg for a total of 90mg daily     • amoxicillin-clavulanate (Augmentin) 875-125 MG per tablet Take 1 tablet by mouth every 12 hours for 10 days. With food 20 tablet 0   • ofloxacin (OCUFLOX) 0.3 % ophthalmic solution 2 drops in the left eye every 4 hours for 2 days, then 4 times daily for 5 days. 10 mL 0   • Cholecalciferol (vitamin D) 50 mcg (2,000 units) capsule      • Aspirin-Acetaminophen-Caffeine (EXCEDRIN PO)      • lamoTRIgine (LAMICTAL) 100 MG tablet Take 100 mg by mouth daily.       No current facility-administered medications for this visit.       SOCIAL HISTORY:  Social History     Tobacco Use   • Smoking status: Former     Packs/day: 0.50     Years: 10.00     Pack years: 5.00     Types: Cigarettes     Quit date: 2011     Years since quittin.1   • Smokeless tobacco: Never   Vaping Use   • Vaping Use: never used   Substance Use Topics   • Alcohol use: Not Currently     Comment: rarely   • Drug use: No     Patient Active Problem List   Diagnosis   • History of major depression     MEDICAL HISTORY:  Past Medical History:   Diagnosis Date    • Bulimia nervosa 1997   • Depressive disorder, not elsewhere classified 1999    Depression         REVIEW OF SYSTEMS:  As per HPI (History of Present Illness).    PHYSICAL EXAM:    Visit Vitals  /80   Pulse 76   Temp 98.4 °F (36.9 °C) (Oral)   LMP 03/01/2023 (Approximate)   SpO2 97%       General: WNWD, 41 year old female in NAD, mildly ill appearing, non-toxic.  Fatigued appearing.   HEENT: PERRL.  Right conjunctiva clear.  Left conjunctiva injected with purulent drainage in the inner canthus and on the lashes.  Upper eyelid is swollen. Nasal mucosa is erythematous and edematous with thick, yellow drainage.  Posterior O/P has thick, yellow discharge draining down the back.  Right TM gray.  Left TM has yellow fluid behind it.  Neck: +posterior cervical LAD, shotty, non-TTP.  CV: RRR with no murmur.  Resp: Regular and non-labored.  Lungs are CTA bilaterally.  Skin: Warm, dry, pink.  No rashes noted.  Neuro: Awake, alert, normal mentation.  Normal gait.  Psych: Normal mood and affect.     ASSESSMENT AND PLAN:     1. Left maxillary sinusitis  -Likely bacterial due to symptoms and length of illness.  - amoxicillin-clavulanate (Augmentin) 875-125 MG per tablet; Take 1 tablet by mouth every 12 hours for 10 days. With food  Dispense: 20 tablet; Refill: 0  -OTC Sudafed or Dayquil can be used as needed.     2. Acute bacterial conjunctivitis of left eye  -2/2 sinusitis with purulent discharge.    - ofloxacin (OCUFLOX) 0.3 % ophthalmic solution; 2 drops in the left eye every 4 hours for 2 days, then 4 times daily for 5 days.  Dispense: 10 mL; Refill: 0  -Patient instructed to continue warm compresses.    Work excuse provided for today and tomorrow.  Patient will call if she need to have it extended.         .       H/O partial nephrectomy  Left  Kidney tumor (benign), left    S/P lumbar discectomy  L5-S1  S/P mastectomy, bilateral  Reconstructive breast surgery

## 2023-04-24 ENCOUNTER — RX RENEWAL (OUTPATIENT)
Age: 53
End: 2023-04-24

## 2023-06-22 NOTE — ASU PATIENT PROFILE, ADULT - NS TRANSFER EYEGLASSES PAIRS
What Type Of Note Output Would You Prefer (Optional)?: Bullet Format
How Severe Is Your Skin Lesion?: moderate
Has Your Skin Lesion Been Treated?: not been treated
Is This A New Presentation, Or A Follow-Up?: Skin Lesions
1 pair

## 2023-07-05 ENCOUNTER — NON-APPOINTMENT (OUTPATIENT)
Age: 53
End: 2023-07-05

## 2023-07-06 ENCOUNTER — NON-APPOINTMENT (OUTPATIENT)
Age: 53
End: 2023-07-06

## 2023-07-07 ENCOUNTER — NON-APPOINTMENT (OUTPATIENT)
Age: 53
End: 2023-07-07

## 2023-09-08 ENCOUNTER — APPOINTMENT (OUTPATIENT)
Dept: FAMILY MEDICINE | Facility: CLINIC | Age: 53
End: 2023-09-08
Payer: COMMERCIAL

## 2023-09-08 VITALS
BODY MASS INDEX: 38.3 KG/M2 | HEART RATE: 84 BPM | DIASTOLIC BLOOD PRESSURE: 72 MMHG | SYSTOLIC BLOOD PRESSURE: 120 MMHG | WEIGHT: 244 LBS | OXYGEN SATURATION: 98 % | HEIGHT: 67 IN

## 2023-09-08 PROCEDURE — 99213 OFFICE O/P EST LOW 20 MIN: CPT

## 2023-09-08 NOTE — HISTORY OF PRESENT ILLNESS
[FreeTextEntry8] : SHARAD MARTIN is a 53 year old female patient presenting to the office today for an APA. She went to the vet today with her cat and it bit her on her right index finger. She was advised to get antibiotics.   She has taken doxycycline and clindamycin before. She has also taken levofloxacin with no issues.

## 2023-09-08 NOTE — ASSESSMENT
[FreeTextEntry1] : SHARAD MARTIN is a 53 year old female patient presenting to the office today for an APA.  #vitals -stable   #cat bite -Start Ciprofloxacin  MG  -Start Clindamycin  MG

## 2023-09-08 NOTE — REVIEW OF SYSTEMS
[Negative] : Heme/Lymph [Itching] : no itching [Mole Changes] : no mole changes [Nail Changes] : no nail changes [Hair Changes] : no hair changes [Skin Rash] : no skin rash [de-identified] : cat bite on right index finger

## 2023-09-08 NOTE — PLAN
[FreeTextEntry1] : -Start Ciprofloxacin  MG  (1 tablet every 12 hours daily) (side effects discussed)  -Start Clindamycin  MG (take 1 capsule 3 times daily) (side effects discussed)  -F/U PRN

## 2023-09-08 NOTE — END OF VISIT
[FreeTextEntry3] : This note was written by Rose Anderson on 09/08/2023, acting as a scribe for Dr. Sridhar MD.  All medic recorded entries were at my, Dr. Staci Waller MD, direction and personally dictated by me in 09/08/2023. I have personally reviewed the chart and agree that the record accurately reflects my personal performance of the history, physical exam, assessment and plan.

## 2023-10-24 NOTE — END OF VISIT
[Time Spent: ___ minutes] : I have spent [unfilled] minutes of time on the encounter. [FreeTextEntry3] : Verbal consent was obtained from patient for telephonic services due to the COVID-19 pandemic. The patient understands the risks of these platforms and limitations of telemedicine with regards to diagnosis and treatment without the ability to perform a thorough physical examination.\par  Drysol Counseling:  I discussed with the patient the risks of drysol/aluminum chloride including but not limited to skin rash, itching, irritation, burning.

## 2023-11-05 ENCOUNTER — EMERGENCY (EMERGENCY)
Facility: HOSPITAL | Age: 53
LOS: 1 days | Discharge: HOME | End: 2023-11-05
Attending: EMERGENCY MEDICINE
Payer: COMMERCIAL

## 2023-11-05 VITALS
WEIGHT: 246.48 LBS | OXYGEN SATURATION: 97 % | HEART RATE: 89 BPM | DIASTOLIC BLOOD PRESSURE: 102 MMHG | HEIGHT: 70 IN | TEMPERATURE: 98 F | SYSTOLIC BLOOD PRESSURE: 172 MMHG | RESPIRATION RATE: 20 BRPM

## 2023-11-05 VITALS
TEMPERATURE: 98 F | RESPIRATION RATE: 19 BRPM | HEART RATE: 88 BPM | OXYGEN SATURATION: 98 % | DIASTOLIC BLOOD PRESSURE: 88 MMHG | SYSTOLIC BLOOD PRESSURE: 157 MMHG

## 2023-11-05 DIAGNOSIS — D30.02 BENIGN NEOPLASM OF LEFT KIDNEY: Chronic | ICD-10-CM

## 2023-11-05 DIAGNOSIS — Z90.13 ACQUIRED ABSENCE OF BILATERAL BREASTS AND NIPPLES: Chronic | ICD-10-CM

## 2023-11-05 DIAGNOSIS — Z98.890 OTHER SPECIFIED POSTPROCEDURAL STATES: Chronic | ICD-10-CM

## 2023-11-05 DIAGNOSIS — Z90.5 ACQUIRED ABSENCE OF KIDNEY: Chronic | ICD-10-CM

## 2023-11-05 LAB
ALBUMIN SERPL ELPH-MCNC: 4.6 G/DL — SIGNIFICANT CHANGE UP (ref 3.3–5.2)
ALBUMIN SERPL ELPH-MCNC: 4.6 G/DL — SIGNIFICANT CHANGE UP (ref 3.3–5.2)
ALP SERPL-CCNC: 91 U/L — SIGNIFICANT CHANGE UP (ref 40–120)
ALP SERPL-CCNC: 91 U/L — SIGNIFICANT CHANGE UP (ref 40–120)
ALT FLD-CCNC: 35 U/L — HIGH
ALT FLD-CCNC: 35 U/L — HIGH
ANION GAP SERPL CALC-SCNC: 14 MMOL/L — SIGNIFICANT CHANGE UP (ref 5–17)
ANION GAP SERPL CALC-SCNC: 14 MMOL/L — SIGNIFICANT CHANGE UP (ref 5–17)
AST SERPL-CCNC: 24 U/L — SIGNIFICANT CHANGE UP
AST SERPL-CCNC: 24 U/L — SIGNIFICANT CHANGE UP
BASOPHILS # BLD AUTO: 0.09 K/UL — SIGNIFICANT CHANGE UP (ref 0–0.2)
BASOPHILS # BLD AUTO: 0.09 K/UL — SIGNIFICANT CHANGE UP (ref 0–0.2)
BASOPHILS NFR BLD AUTO: 0.7 % — SIGNIFICANT CHANGE UP (ref 0–2)
BASOPHILS NFR BLD AUTO: 0.7 % — SIGNIFICANT CHANGE UP (ref 0–2)
BILIRUB SERPL-MCNC: 0.3 MG/DL — LOW (ref 0.4–2)
BILIRUB SERPL-MCNC: 0.3 MG/DL — LOW (ref 0.4–2)
BUN SERPL-MCNC: 19.2 MG/DL — SIGNIFICANT CHANGE UP (ref 8–20)
BUN SERPL-MCNC: 19.2 MG/DL — SIGNIFICANT CHANGE UP (ref 8–20)
CALCIUM SERPL-MCNC: 9.6 MG/DL — SIGNIFICANT CHANGE UP (ref 8.4–10.5)
CALCIUM SERPL-MCNC: 9.6 MG/DL — SIGNIFICANT CHANGE UP (ref 8.4–10.5)
CHLORIDE SERPL-SCNC: 100 MMOL/L — SIGNIFICANT CHANGE UP (ref 96–108)
CHLORIDE SERPL-SCNC: 100 MMOL/L — SIGNIFICANT CHANGE UP (ref 96–108)
CO2 SERPL-SCNC: 25 MMOL/L — SIGNIFICANT CHANGE UP (ref 22–29)
CO2 SERPL-SCNC: 25 MMOL/L — SIGNIFICANT CHANGE UP (ref 22–29)
CREAT SERPL-MCNC: 0.82 MG/DL — SIGNIFICANT CHANGE UP (ref 0.5–1.3)
CREAT SERPL-MCNC: 0.82 MG/DL — SIGNIFICANT CHANGE UP (ref 0.5–1.3)
EGFR: 85 ML/MIN/1.73M2 — SIGNIFICANT CHANGE UP
EGFR: 85 ML/MIN/1.73M2 — SIGNIFICANT CHANGE UP
EOSINOPHIL # BLD AUTO: 0.06 K/UL — SIGNIFICANT CHANGE UP (ref 0–0.5)
EOSINOPHIL # BLD AUTO: 0.06 K/UL — SIGNIFICANT CHANGE UP (ref 0–0.5)
EOSINOPHIL NFR BLD AUTO: 0.5 % — SIGNIFICANT CHANGE UP (ref 0–6)
EOSINOPHIL NFR BLD AUTO: 0.5 % — SIGNIFICANT CHANGE UP (ref 0–6)
GLUCOSE SERPL-MCNC: 134 MG/DL — HIGH (ref 70–99)
GLUCOSE SERPL-MCNC: 134 MG/DL — HIGH (ref 70–99)
HCT VFR BLD CALC: 44.5 % — SIGNIFICANT CHANGE UP (ref 34.5–45)
HCT VFR BLD CALC: 44.5 % — SIGNIFICANT CHANGE UP (ref 34.5–45)
HGB BLD-MCNC: 14.7 G/DL — SIGNIFICANT CHANGE UP (ref 11.5–15.5)
HGB BLD-MCNC: 14.7 G/DL — SIGNIFICANT CHANGE UP (ref 11.5–15.5)
IMM GRANULOCYTES NFR BLD AUTO: 1.8 % — HIGH (ref 0–0.9)
IMM GRANULOCYTES NFR BLD AUTO: 1.8 % — HIGH (ref 0–0.9)
LIDOCAIN IGE QN: 35 U/L — SIGNIFICANT CHANGE UP (ref 22–51)
LIDOCAIN IGE QN: 35 U/L — SIGNIFICANT CHANGE UP (ref 22–51)
LYMPHOCYTES # BLD AUTO: 1.7 K/UL — SIGNIFICANT CHANGE UP (ref 1–3.3)
LYMPHOCYTES # BLD AUTO: 1.7 K/UL — SIGNIFICANT CHANGE UP (ref 1–3.3)
LYMPHOCYTES # BLD AUTO: 13.6 % — SIGNIFICANT CHANGE UP (ref 13–44)
LYMPHOCYTES # BLD AUTO: 13.6 % — SIGNIFICANT CHANGE UP (ref 13–44)
MCHC RBC-ENTMCNC: 29.3 PG — SIGNIFICANT CHANGE UP (ref 27–34)
MCHC RBC-ENTMCNC: 29.3 PG — SIGNIFICANT CHANGE UP (ref 27–34)
MCHC RBC-ENTMCNC: 33 GM/DL — SIGNIFICANT CHANGE UP (ref 32–36)
MCHC RBC-ENTMCNC: 33 GM/DL — SIGNIFICANT CHANGE UP (ref 32–36)
MCV RBC AUTO: 88.8 FL — SIGNIFICANT CHANGE UP (ref 80–100)
MCV RBC AUTO: 88.8 FL — SIGNIFICANT CHANGE UP (ref 80–100)
MONOCYTES # BLD AUTO: 0.42 K/UL — SIGNIFICANT CHANGE UP (ref 0–0.9)
MONOCYTES # BLD AUTO: 0.42 K/UL — SIGNIFICANT CHANGE UP (ref 0–0.9)
MONOCYTES NFR BLD AUTO: 3.4 % — SIGNIFICANT CHANGE UP (ref 2–14)
MONOCYTES NFR BLD AUTO: 3.4 % — SIGNIFICANT CHANGE UP (ref 2–14)
NEUTROPHILS # BLD AUTO: 10.01 K/UL — HIGH (ref 1.8–7.4)
NEUTROPHILS # BLD AUTO: 10.01 K/UL — HIGH (ref 1.8–7.4)
NEUTROPHILS NFR BLD AUTO: 80 % — HIGH (ref 43–77)
NEUTROPHILS NFR BLD AUTO: 80 % — HIGH (ref 43–77)
PLATELET # BLD AUTO: 300 K/UL — SIGNIFICANT CHANGE UP (ref 150–400)
PLATELET # BLD AUTO: 300 K/UL — SIGNIFICANT CHANGE UP (ref 150–400)
POTASSIUM SERPL-MCNC: 4.6 MMOL/L — SIGNIFICANT CHANGE UP (ref 3.5–5.3)
POTASSIUM SERPL-MCNC: 4.6 MMOL/L — SIGNIFICANT CHANGE UP (ref 3.5–5.3)
POTASSIUM SERPL-SCNC: 4.6 MMOL/L — SIGNIFICANT CHANGE UP (ref 3.5–5.3)
POTASSIUM SERPL-SCNC: 4.6 MMOL/L — SIGNIFICANT CHANGE UP (ref 3.5–5.3)
PROT SERPL-MCNC: 8.3 G/DL — SIGNIFICANT CHANGE UP (ref 6.6–8.7)
PROT SERPL-MCNC: 8.3 G/DL — SIGNIFICANT CHANGE UP (ref 6.6–8.7)
RBC # BLD: 5.01 M/UL — SIGNIFICANT CHANGE UP (ref 3.8–5.2)
RBC # BLD: 5.01 M/UL — SIGNIFICANT CHANGE UP (ref 3.8–5.2)
RBC # FLD: 13.6 % — SIGNIFICANT CHANGE UP (ref 10.3–14.5)
RBC # FLD: 13.6 % — SIGNIFICANT CHANGE UP (ref 10.3–14.5)
SODIUM SERPL-SCNC: 139 MMOL/L — SIGNIFICANT CHANGE UP (ref 135–145)
SODIUM SERPL-SCNC: 139 MMOL/L — SIGNIFICANT CHANGE UP (ref 135–145)
TROPONIN T SERPL-MCNC: <0.01 NG/ML — SIGNIFICANT CHANGE UP (ref 0–0.06)
TROPONIN T SERPL-MCNC: <0.01 NG/ML — SIGNIFICANT CHANGE UP (ref 0–0.06)
WBC # BLD: 12.5 K/UL — HIGH (ref 3.8–10.5)
WBC # BLD: 12.5 K/UL — HIGH (ref 3.8–10.5)
WBC # FLD AUTO: 12.5 K/UL — HIGH (ref 3.8–10.5)
WBC # FLD AUTO: 12.5 K/UL — HIGH (ref 3.8–10.5)

## 2023-11-05 PROCEDURE — 96374 THER/PROPH/DIAG INJ IV PUSH: CPT | Mod: XU

## 2023-11-05 PROCEDURE — 74177 CT ABD & PELVIS W/CONTRAST: CPT | Mod: MA

## 2023-11-05 PROCEDURE — 80053 COMPREHEN METABOLIC PANEL: CPT

## 2023-11-05 PROCEDURE — 96375 TX/PRO/DX INJ NEW DRUG ADDON: CPT

## 2023-11-05 PROCEDURE — 76705 ECHO EXAM OF ABDOMEN: CPT | Mod: 26

## 2023-11-05 PROCEDURE — 99285 EMERGENCY DEPT VISIT HI MDM: CPT

## 2023-11-05 PROCEDURE — 85025 COMPLETE CBC W/AUTO DIFF WBC: CPT

## 2023-11-05 PROCEDURE — 93005 ELECTROCARDIOGRAM TRACING: CPT

## 2023-11-05 PROCEDURE — 83690 ASSAY OF LIPASE: CPT

## 2023-11-05 PROCEDURE — 36415 COLL VENOUS BLD VENIPUNCTURE: CPT

## 2023-11-05 PROCEDURE — 74177 CT ABD & PELVIS W/CONTRAST: CPT | Mod: 26,MA

## 2023-11-05 PROCEDURE — 84484 ASSAY OF TROPONIN QUANT: CPT

## 2023-11-05 PROCEDURE — 96376 TX/PRO/DX INJ SAME DRUG ADON: CPT

## 2023-11-05 PROCEDURE — 99285 EMERGENCY DEPT VISIT HI MDM: CPT | Mod: 25

## 2023-11-05 PROCEDURE — 76705 ECHO EXAM OF ABDOMEN: CPT

## 2023-11-05 PROCEDURE — 93010 ELECTROCARDIOGRAM REPORT: CPT

## 2023-11-05 RX ORDER — ACETAMINOPHEN 500 MG
1000 TABLET ORAL ONCE
Refills: 0 | Status: COMPLETED | OUTPATIENT
Start: 2023-11-05 | End: 2023-11-05

## 2023-11-05 RX ORDER — ONDANSETRON 8 MG/1
4 TABLET, FILM COATED ORAL ONCE
Refills: 0 | Status: COMPLETED | OUTPATIENT
Start: 2023-11-05 | End: 2023-11-05

## 2023-11-05 RX ORDER — DIPHENHYDRAMINE HCL 50 MG
25 CAPSULE ORAL ONCE
Refills: 0 | Status: COMPLETED | OUTPATIENT
Start: 2023-11-05 | End: 2023-11-05

## 2023-11-05 RX ORDER — ONDANSETRON 8 MG/1
1 TABLET, FILM COATED ORAL
Refills: 0
Start: 2023-11-05 | End: 2023-11-07

## 2023-11-05 RX ORDER — SODIUM CHLORIDE 9 MG/ML
1000 INJECTION INTRAMUSCULAR; INTRAVENOUS; SUBCUTANEOUS ONCE
Refills: 0 | Status: COMPLETED | OUTPATIENT
Start: 2023-11-05 | End: 2023-11-05

## 2023-11-05 RX ORDER — ONDANSETRON 8 MG/1
1 TABLET, FILM COATED ORAL
Refills: 0
Start: 2023-11-05

## 2023-11-05 RX ORDER — KETOROLAC TROMETHAMINE 30 MG/ML
15 SYRINGE (ML) INJECTION ONCE
Refills: 0 | Status: DISCONTINUED | OUTPATIENT
Start: 2023-11-05 | End: 2023-11-05

## 2023-11-05 RX ORDER — MORPHINE SULFATE 50 MG/1
4 CAPSULE, EXTENDED RELEASE ORAL ONCE
Refills: 0 | Status: DISCONTINUED | OUTPATIENT
Start: 2023-11-05 | End: 2023-11-05

## 2023-11-05 RX ORDER — METOCLOPRAMIDE HCL 10 MG
10 TABLET ORAL ONCE
Refills: 0 | Status: COMPLETED | OUTPATIENT
Start: 2023-11-05 | End: 2023-11-05

## 2023-11-05 RX ADMIN — Medication 25 MILLIGRAM(S): at 09:21

## 2023-11-05 RX ADMIN — SODIUM CHLORIDE 1000 MILLILITER(S): 9 INJECTION INTRAMUSCULAR; INTRAVENOUS; SUBCUTANEOUS at 08:57

## 2023-11-05 RX ADMIN — MORPHINE SULFATE 4 MILLIGRAM(S): 50 CAPSULE, EXTENDED RELEASE ORAL at 09:40

## 2023-11-05 RX ADMIN — MORPHINE SULFATE 4 MILLIGRAM(S): 50 CAPSULE, EXTENDED RELEASE ORAL at 11:44

## 2023-11-05 RX ADMIN — Medication 10 MILLIGRAM(S): at 11:21

## 2023-11-05 RX ADMIN — Medication 400 MILLIGRAM(S): at 08:57

## 2023-11-05 RX ADMIN — Medication 15 MILLIGRAM(S): at 10:48

## 2023-11-05 RX ADMIN — ONDANSETRON 4 MILLIGRAM(S): 8 TABLET, FILM COATED ORAL at 08:49

## 2023-11-05 NOTE — ED PROVIDER NOTE - PATIENT PORTAL LINK FT
You can access the FollowMyHealth Patient Portal offered by [facility] by registering at the following website: [email or web address]. By joining [employer]’s FollowMyHealth portal, you will also be able to view your health information using other applications (apps) compatible with our system.

## 2023-11-05 NOTE — ED PROVIDER NOTE - NSICDXFAMILYHX_GEN_ALL_CORE_FT
FAMILY HISTORY:  Family history of cerebrovascular accident (CVA)  Family history of colon cancer  Family history of hypertension  Family history of thyroid cancer

## 2023-11-05 NOTE — ED PROVIDER NOTE - IV ALTEPLASE ADMIN OUTSIDE HIDDEN
Pt awake and alert; resting quietly on stretcher. Pt remains on continuous cardiac and pulse ox monitoring with non-invasive blood pressure to cycle every 30 minutes. Pt denies pain at this time; no acute distress or discomfort reported or observed. Pt denies restroom needs at this time; is able to reposition self on stretcher. Bed locked in lowest position; side rails up and locked x 2; call light, bedside table, and personal belongings within reach. Room assessed for safety measures and cleanliness; no action needed at this time. Plan of care discussed. Pt instructed to alert nurse for assistance and before attempting to get out of bed; verbalizes understanding. Pt denies needs or complaints at this time; will continue to monitor. Mother at bedside.      show

## 2023-11-05 NOTE — ED PROVIDER NOTE - NS ED ROS FT
General: Denies fever, chills  HEENT: Denies sore throat  Neck: Denies neck pain  Resp: Denies coughing, SOB  Cardiovascular: Denies CP, palpitations, LE edema  GI: Denies diarrhea, constipation, blood in stool. N/V abd pain  : Denies dysuria, hematuria  MSK: Denies back pain  Neuro: Denies HA, dizziness, numbness, weakness  Skin: Denies rashes.

## 2023-11-05 NOTE — ED PROVIDER NOTE - OBJECTIVE STATEMENT
53y female w/ pmh of breast cancer, asthma, hysterectomy, and nephrectomy presenting with sudden onset central abdominal pain that came on at 0100 today. States she went to bed feeling well. She was awoken by dull abdominal pain radiating to the back and up to her chest. There was associated chest discomfort, nausea, and vomiting. She denies fever, diarrhea, black or bloody stool, vaginal bleeding, urinary symptoms. She tried taking simethicone without relief.

## 2023-11-05 NOTE — ED PROVIDER NOTE - NSICDXPASTSURGICALHX_GEN_ALL_CORE_FT
PAST SURGICAL HISTORY:  H/O partial nephrectomy Left    Kidney tumor (benign), left     S/P lumbar discectomy L5-S1    S/P mastectomy, bilateral Reconstructive breast surgery

## 2023-11-05 NOTE — ED ADULT NURSE NOTE - CHIEF COMPLAINT QUOTE
Patient presents to ED with c/o epigastric abdominal pain radiating to chest and back that started at 0100 associated with SOB.  Took Simethicone with no relief.  No known cardiac history.  EKG in progress.  (+) vomiting in triage

## 2023-11-05 NOTE — ED PROVIDER NOTE - NSFOLLOWUPINSTRUCTIONS_ED_ALL_ED_FT
Cholelithiasis is a disease in which gallstones form in the gallbladder. The gallbladder is an organ that stores bile. Bile is a fluid that helps to digest fats. Gallstones begin as small crystals and can slowly grow into stones. They may cause no symptoms until they block the gallbladder duct, or cystic duct, when the gallbladder tightens (contracts) after food is eaten. This can cause pain and is known as a gallbladder attack, or biliary colic.    There are two main types of gallstones:    Cholesterol stones. These are the most common type of gallstone. These stones are made of hardened cholesterol and are usually yellow-green in color. Cholesterol is a fat-like substance that is made in the liver.  Pigment stones. These are dark in color and are made of a red-yellow substance, called bilirubin,that forms when hemoglobin from red blood cells breaks down.    What are the causes?  This condition may be caused by an imbalance in the different parts that make bile. This can happen if the bile:    Has too much bilirubin. This can happen in certain blood diseases, such as sickle cell anemia.  Has too much cholesterol.  Does not have enough bile salts. These salts help the body absorb and digest fats.    In some cases, this condition can also be caused by the gallbladder not emptying completely or often enough. This is common during pregnancy.    What increases the risk?  The following factors may make you more likely to develop this condition:    Being female.  Having multiple pregnancies. Health care providers sometimes advise removing diseased gallbladders before future pregnancies.  Eating a diet that is heavy in fried foods, fat, and refined carbohydrates, such as white bread and white rice.  Being obese.  Being older than age 40.  Using medicines that contain female hormones (estrogen) for a long time.  Losing weight quickly.  Having a family history of gallstones.  Having certain medical problems, such as:    Diabetes mellitus.  Cystic fibrosis.  Crohn's disease.  Cirrhosis or other long-term (chronic) liver disease.  Certain blood diseases, such as sickle cell anemia or leukemia.    What are the signs or symptoms?  In many cases, having gallstones causes no symptoms. When you have gallstones but do not have symptoms, you have silent gallstones. If a gallstone blocks your bile duct, it can cause a gallbladder attack. The main symptom of a gallbladder attack is sudden pain in the upper right part of the abdomen. The pain:    Usually comes at night or after eating.   Can last for one hour or more.  Can spread to your right shoulder, back, or chest.  Can feel like indigestion. This is discomfort, burning, or fullness in your upper abdomen.    If the bile duct is blocked for more than a few hours, it can cause an infection or inflammation of your gallbladder (cholecystitis), liver, or pancreas. This can cause:    Nausea or vomiting.  Bloating.  Pain in your abdomen that lasts for 5 hours or longer.  Tenderness in your upper abdomen, often in the upper right section and under your rib cage.  Fever or chills.  Skin or the white parts of your eyes turning yellow (jaundice). This usually happens when a stone has blocked bile from passing through the common bile duct.  Dark urine or light-colored stools.    How is this diagnosed?  This condition may be diagnosed based on:    A physical exam.  Your medical history.  Ultrasound.  CT scan.  MRI.    You may also have other tests, including:    Blood tests to check for signs of an infection or inflammation.  Cholescintigraphy, or HIDA scan. This is a scan of your gallbladder and bile ducts (biliary system) using non-harmful radioactive material and special cameras that can see the radioactive material.  Endoscopic retrograde cholangiopancreatogram. This involves inserting a small tube with a camera on the end (endoscope) through your mouth to look at bile ducts and check for blockages.    How is this treated?  Treatment for this condition depends on the severity of the condition. Silent gallstones do not need treatment. Treatment may be needed if a blockage causes a gallbladder attack or other symptoms. Treatment may include:    Home care, if symptoms are not severe.    During a simple gallbladder attack, stop eating and drinking for 12–24 hours (except for water and clear liquids). This helps to "cool down" your gallbladder. After 1 or 2 days, you can start to eat a diet of simple or clear foods, such as broths and crackers.  You may also need medicines for pain or nausea or both.   If you have cholecystitis and an infection, you will need antibiotics.  A hospital stay, if needed for pain control or for cholecystitis with severe infection.  Cholecystectomy, or surgery to remove your gallbladder. This is the most common treatment if all other treatments have not worked.  Medicines to break up gallstones. These are most effective at treating small gallstones. Medicines may be used for up to 6–12 months.  Endoscopic retrograde cholangiopancreatogram. A small basket can be attached to the endoscope and used to capture and remove gallstones, mainly those that are in the common bile duct.    Follow these instructions at home:      Medicines    Take over-the-counter and prescription medicines only as told by your health care provider.  If you were prescribed an antibiotic medicine, take it as told by your health care provider. Do not stop taking the antibiotic even if you start to feel better.  Ask your health care provider if the medicine prescribed to you requires you to avoid driving or using machinery.        Eating and drinking    Drink enough fluid to keep your urine pale yellow. This is important during a gallbladder attack. Water and clear liquids are preferred.  Follow a healthy diet. This includes:    Reducing fatty foods, such as fried food and foods high in cholesterol.  Reducing refined carbohydrates, such as white bread and white rice.  Eating more fiber. Aim for foods such as almonds, fruit, and beans.        Alcohol use    If you drink alcohol:    Limit how much you use to:    0–1 drink a day for nonpregnant women.  0–2 drinks a day for men.  Be aware of how much alcohol is in your drink. In the [place] one drink equals one 12 oz bottle of beer (355 mL), one 5 oz glass of wine (148 mL), or one 1½ oz glass of hard liquor (44 mL).        General instructions    Do not use any products that contain nicotine or tobacco, such as cigarettes, e-cigarettes, and chewing tobacco. If you need help quitting, ask your health care provider.  Maintain a healthy weight.  Keep all follow-up visits as told by your health care provider. These may include consultations with a surgeon or specialist. This is important.    Where to find more information  [employer] [place] [employer]: www.niddk.nih.gov    Contact a health care provider if:  You think you have had a gallbladder attack.  You have been diagnosed with silent gallstones and you develop pain in your abdomen or indigestion.  You begin to have attacks more often.  You have dark urine or light-colored stools.    Get help right away if:  You have pain from a gallbladder attack that lasts for more than 2 hours.  You have pain in your abdomen that lasts for more than 5 hours or is getting worse.  You have a fever or chills.  You have nausea and vomiting that do not go away.  You develop jaundice.    Summary  Cholelithiasis is a disease in which gallstones form in the gallbladder.  This condition may be caused by an imbalance in the different parts that make bile. This can happen if your bile has too much bilirubin or cholesterol, or does not have enough bile salts.  Treatment for gallstones depends on the severity of the condition. Silent gallstones do not need treatment.  If gallstones cause a gallbladder attack or other symptoms, treatment usually involves not eating or drinking anything. Treatment may also include pain medicines and antibiotics, and it sometimes includes a hospital stay.  Surgery to remove the gallbladder is common if all other treatments have not worked.    ADDITIONAL NOTES AND INSTRUCTIONS    Please follow up with your Primary MD in 24-48 hr.  Seek immediate medical care for any new/worsening signs or symptoms.

## 2023-11-05 NOTE — ED PROVIDER NOTE - PHYSICAL EXAMINATION
General: Awake, alert, lying in bed in NAD  HEENT: Normocephalic, atraumatic. No scleral icterus or conjunctival injection. EOMI. Moist mucous membranes. Oropharynx clear.   Neck:. Soft and supple.  Cardiac: RRR, Peripheral pulses 2+ and symmetric. No LE edema.  Resp: Lungs CTAB. No accessory muscle use  Abd: Soft, non-distended. No guarding, rebound, or rigidity. Central, RUQ, and RLQ tenderness.   Back: Spine midline and non-tender.   Skin: No rashes, abrasions, or lacerations.  Neuro: AO x 4. Moves all extremities symmetrically. Motor strength and sensation grossly intact.  Psych: Appropriate mood and affect

## 2023-11-05 NOTE — ED ADULT TRIAGE NOTE - CHIEF COMPLAINT QUOTE
Patient presents to ED with c/o epigastric abdominal pain radiating to chest and back that started at 0100 associated with SOB.  Took Simethicone with no relief.  No known cardiac history.  EKG in progress Patient presents to ED with c/o epigastric abdominal pain radiating to chest and back that started at 0100 associated with SOB.  Took Simethicone with no relief.  No known cardiac history.  EKG in progress.  (+) vomiting in triage

## 2023-11-05 NOTE — ED PROVIDER NOTE - CLINICAL SUMMARY MEDICAL DECISION MAKING FREE TEXT BOX
53y female w/ pmh of breast cancer, asthma, hysterectomy, and nephrectomy evaluated for abdominal pain that began today. Patient well appearing, nontoxic, stable vitals, moderate central and right sided abdominal tenderness. symptoms improved with zofran, tylenol, morphine, toradol, and IV fluids.     labs, US, CT    EKG, trop negative, low risk ACS

## 2023-11-05 NOTE — ED ADULT NURSE NOTE - OBJECTIVE STATEMENT
Patient presents to ED with c/o epigastric abdominal pain radiating to chest and back that started at 0100 associated with SOB.  Took Simethicone with no relief.  No known cardiac history.  EKG in progress.  Patient was vomiting on arrival to hospital

## 2023-11-05 NOTE — ED PROVIDER NOTE - PROGRESS NOTE DETAILS
pain initially improved with IV tylenol. pain is now 5/10 with morphine and toradol Patient feeling significantly improved.  Gallbladder stones noted on evaluation though no signs of cholecystitis or other surgical emergency.  Patient tolerating p.o.  Stable for discharge home with outpatient surgery follow-up and return precautions

## 2023-11-05 NOTE — ED PROVIDER NOTE - ATTENDING CONTRIBUTION TO CARE
53y female w/ pmh of breast cancer, asthma, hysterectomy, and nephrectomy presenting with sudden onset central abdominal pain that came on at 0100 today. States she went to bed feeling well. She was awoken by dull abdominal pain radiating to the back and up to her chest. There was associated chest discomfort, nausea, and vomiting. She denies fever, diarrhea, black or bloody stool, vaginal bleeding, urinary symptoms. She tried taking simethicone without relief.    + ttp in RUQ and epigastrium on exam. also with mild rlq ttp. will obtain labs, sono, ct a/p, pain control, reassess

## 2023-11-07 ENCOUNTER — APPOINTMENT (OUTPATIENT)
Dept: GASTROENTEROLOGY | Facility: CLINIC | Age: 53
End: 2023-11-07
Payer: COMMERCIAL

## 2023-11-07 VITALS
BODY MASS INDEX: 38.3 KG/M2 | SYSTOLIC BLOOD PRESSURE: 105 MMHG | HEIGHT: 67 IN | RESPIRATION RATE: 16 BRPM | DIASTOLIC BLOOD PRESSURE: 60 MMHG | WEIGHT: 244 LBS

## 2023-11-07 DIAGNOSIS — R31.29 OTHER MICROSCOPIC HEMATURIA: ICD-10-CM

## 2023-11-07 DIAGNOSIS — Z96.649 PRESENCE OF UNSPECIFIED ARTIFICIAL HIP JOINT: ICD-10-CM

## 2023-11-07 DIAGNOSIS — K21.9 GASTRO-ESOPHAGEAL REFLUX DISEASE W/OUT ESOPHAGITIS: ICD-10-CM

## 2023-11-07 DIAGNOSIS — K80.20 CALCULUS OF GALLBLADDER W/OUT CHOLECYSTITIS W/OUT OBSTRUCTION: ICD-10-CM

## 2023-11-07 DIAGNOSIS — K63.5 POLYP OF COLON: ICD-10-CM

## 2023-11-07 DIAGNOSIS — K11.20 SIALOADENITIS, UNSPECIFIED: ICD-10-CM

## 2023-11-07 DIAGNOSIS — L50.8 OTHER URTICARIA: ICD-10-CM

## 2023-11-07 DIAGNOSIS — T78.3XXA ANGIONEUROTIC EDEMA, INITIAL ENCOUNTER: ICD-10-CM

## 2023-11-07 DIAGNOSIS — G47.33 OBSTRUCTIVE SLEEP APNEA (ADULT) (PEDIATRIC): ICD-10-CM

## 2023-11-07 DIAGNOSIS — K20.80 OTHER ESOPHAGITIS WITHOUT BLEEDING: ICD-10-CM

## 2023-11-07 DIAGNOSIS — Z86.010 PERSONAL HISTORY OF COLONIC POLYPS: ICD-10-CM

## 2023-11-07 DIAGNOSIS — M16.9 OSTEOARTHRITIS OF HIP, UNSPECIFIED: ICD-10-CM

## 2023-11-07 DIAGNOSIS — J45.40 MODERATE PERSISTENT ASTHMA, UNCOMPLICATED: ICD-10-CM

## 2023-11-07 DIAGNOSIS — R74.01 ELEVATION OF LEVELS OF LIVER TRANSAMINASE LEVELS: ICD-10-CM

## 2023-11-07 DIAGNOSIS — K80.50 CALCULUS OF BILE DUCT W/OUT CHOLANGITIS OR CHOLECYSTITIS W/OUT OBSTRUCTION: ICD-10-CM

## 2023-11-07 DIAGNOSIS — J30.1 ALLERGIC RHINITIS DUE TO POLLEN: ICD-10-CM

## 2023-11-07 PROCEDURE — 99204 OFFICE O/P NEW MOD 45 MIN: CPT

## 2023-11-07 RX ORDER — METHYLDOPA/HYDROCHLOROTHIAZIDE 250MG-25MG
TABLET ORAL
Refills: 0 | Status: DISCONTINUED | COMMUNITY
End: 2023-11-07

## 2023-11-07 RX ORDER — CLINDAMYCIN HYDROCHLORIDE 300 MG/1
300 CAPSULE ORAL
Qty: 21 | Refills: 0 | Status: DISCONTINUED | COMMUNITY
Start: 2023-09-08 | End: 2023-11-07

## 2023-11-07 RX ORDER — CLINDAMYCIN HYDROCHLORIDE 300 MG/1
300 CAPSULE ORAL
Qty: 21 | Refills: 0 | Status: DISCONTINUED | COMMUNITY
Start: 2023-03-09 | End: 2023-11-07

## 2023-11-07 RX ORDER — CIPROFLOXACIN HYDROCHLORIDE 500 MG/1
500 TABLET, FILM COATED ORAL
Qty: 14 | Refills: 0 | Status: DISCONTINUED | COMMUNITY
Start: 2023-09-08 | End: 2023-11-07

## 2023-11-13 ENCOUNTER — RESULT REVIEW (OUTPATIENT)
Age: 53
End: 2023-11-13

## 2023-11-13 ENCOUNTER — APPOINTMENT (OUTPATIENT)
Dept: SURGERY | Facility: CLINIC | Age: 53
End: 2023-11-13
Payer: COMMERCIAL

## 2023-11-13 VITALS
TEMPERATURE: 209.48 F | BODY MASS INDEX: 39.07 KG/M2 | OXYGEN SATURATION: 96 % | WEIGHT: 246 LBS | HEART RATE: 78 BPM | RESPIRATION RATE: 16 BRPM | SYSTOLIC BLOOD PRESSURE: 117 MMHG | HEIGHT: 66.5 IN | DIASTOLIC BLOOD PRESSURE: 69 MMHG

## 2023-11-13 DIAGNOSIS — Z01.818 ENCOUNTER FOR OTHER PREPROCEDURAL EXAMINATION: ICD-10-CM

## 2023-11-13 DIAGNOSIS — R10.84 GENERALIZED ABDOMINAL PAIN: ICD-10-CM

## 2023-11-13 DIAGNOSIS — K83.8 OTHER SPECIFIED DISEASES OF BILIARY TRACT: ICD-10-CM

## 2023-11-13 DIAGNOSIS — T50.995A ADVERSE EFFECT OF OTHER DRUGS, MEDICAMENTS AND BIOLOGICAL SUBSTANCES, INITIAL ENCOUNTER: ICD-10-CM

## 2023-11-13 DIAGNOSIS — R14.0 ABDOMINAL DISTENSION (GASEOUS): ICD-10-CM

## 2023-11-13 PROCEDURE — 99203 OFFICE O/P NEW LOW 30 MIN: CPT

## 2023-11-13 RX ORDER — ONDANSETRON 4 MG/1
4 TABLET, ORALLY DISINTEGRATING ORAL 3 TIMES DAILY
Qty: 42 | Refills: 1 | Status: ACTIVE | COMMUNITY
Start: 2023-11-13 | End: 1900-01-01

## 2023-11-14 PROBLEM — T50.995A ALLERGY TO IVP DYE: Status: ACTIVE | Noted: 2023-11-14

## 2023-11-14 PROBLEM — Z01.818 PRE-PROCEDURAL EXAMINATION: Status: ACTIVE | Noted: 2023-11-14

## 2023-11-15 ENCOUNTER — APPOINTMENT (OUTPATIENT)
Dept: CT IMAGING | Facility: CLINIC | Age: 53
End: 2023-11-15
Payer: COMMERCIAL

## 2023-11-15 ENCOUNTER — OUTPATIENT (OUTPATIENT)
Dept: OUTPATIENT SERVICES | Facility: HOSPITAL | Age: 53
LOS: 1 days | End: 2023-11-15
Payer: COMMERCIAL

## 2023-11-15 DIAGNOSIS — D30.02 BENIGN NEOPLASM OF LEFT KIDNEY: Chronic | ICD-10-CM

## 2023-11-15 DIAGNOSIS — R14.0 ABDOMINAL DISTENSION (GASEOUS): ICD-10-CM

## 2023-11-15 DIAGNOSIS — Z90.13 ACQUIRED ABSENCE OF BILATERAL BREASTS AND NIPPLES: Chronic | ICD-10-CM

## 2023-11-15 DIAGNOSIS — R11.2 NAUSEA WITH VOMITING, UNSPECIFIED: ICD-10-CM

## 2023-11-15 DIAGNOSIS — Z90.5 ACQUIRED ABSENCE OF KIDNEY: Chronic | ICD-10-CM

## 2023-11-15 DIAGNOSIS — Z00.00 ENCOUNTER FOR GENERAL ADULT MEDICAL EXAMINATION WITHOUT ABNORMAL FINDINGS: ICD-10-CM

## 2023-11-15 DIAGNOSIS — Z98.890 OTHER SPECIFIED POSTPROCEDURAL STATES: Chronic | ICD-10-CM

## 2023-11-15 PROCEDURE — 74177 CT ABD & PELVIS W/CONTRAST: CPT

## 2023-11-15 PROCEDURE — 74177 CT ABD & PELVIS W/CONTRAST: CPT | Mod: 26

## 2023-12-01 ENCOUNTER — NON-APPOINTMENT (OUTPATIENT)
Age: 53
End: 2023-12-01

## 2023-12-01 ENCOUNTER — APPOINTMENT (OUTPATIENT)
Dept: GASTROENTEROLOGY | Facility: CLINIC | Age: 53
End: 2023-12-01
Payer: COMMERCIAL

## 2023-12-01 DIAGNOSIS — K76.0 FATTY (CHANGE OF) LIVER, NOT ELSEWHERE CLASSIFIED: ICD-10-CM

## 2023-12-01 PROCEDURE — 91200 LIVER ELASTOGRAPHY: CPT

## 2023-12-18 ENCOUNTER — APPOINTMENT (OUTPATIENT)
Dept: NEUROLOGY | Facility: CLINIC | Age: 53
End: 2023-12-18
Payer: COMMERCIAL

## 2023-12-18 VITALS
HEIGHT: 66.5 IN | WEIGHT: 245 LBS | DIASTOLIC BLOOD PRESSURE: 80 MMHG | SYSTOLIC BLOOD PRESSURE: 128 MMHG | BODY MASS INDEX: 38.91 KG/M2

## 2023-12-18 DIAGNOSIS — M54.16 RADICULOPATHY, LUMBAR REGION: ICD-10-CM

## 2023-12-18 DIAGNOSIS — G62.9 POLYNEUROPATHY, UNSPECIFIED: ICD-10-CM

## 2023-12-18 PROCEDURE — 99214 OFFICE O/P EST MOD 30 MIN: CPT

## 2023-12-18 RX ORDER — GABAPENTIN 300 MG/1
300 CAPSULE ORAL
Qty: 270 | Refills: 3 | Status: ACTIVE | COMMUNITY
Start: 2017-12-20 | End: 1900-01-01

## 2023-12-18 NOTE — HISTORY OF PRESENT ILLNESS
[FreeTextEntry1] : I have been following her for a number of years for peripheral neuropathy felt related to prior chemotherapy for breast cancer. She was on gabapentin 300 mg 3 times a day with excellent control of her neuropathic pain.  She was getting some swelling in her legs which was felt to be due to the gabapentin.  She has reduced the gabapentin to twice a day and is on a water pill with improvement of the swelling.  Pain remains under reasonably good control.  She also some chronic back pain and uses an occasional Flexeril.  She is S/P  left hip replacement.

## 2023-12-18 NOTE — ASSESSMENT
[FreeTextEntry1] : Peripheral neuropathy. Chronic back pain.  Clinically stable.  She will continue the gabapentin 100 mg twice a day and an occasional Flexeril when needed for her back.  Followup in one year, sooner should the need arise.

## 2024-01-05 ENCOUNTER — EMERGENCY (EMERGENCY)
Facility: HOSPITAL | Age: 54
LOS: 1 days | Discharge: HOME | End: 2024-01-05
Attending: STUDENT IN AN ORGANIZED HEALTH CARE EDUCATION/TRAINING PROGRAM
Payer: COMMERCIAL

## 2024-01-05 VITALS
HEART RATE: 98 BPM | OXYGEN SATURATION: 100 % | TEMPERATURE: 97 F | SYSTOLIC BLOOD PRESSURE: 176 MMHG | DIASTOLIC BLOOD PRESSURE: 96 MMHG | RESPIRATION RATE: 18 BRPM

## 2024-01-05 DIAGNOSIS — D30.02 BENIGN NEOPLASM OF LEFT KIDNEY: Chronic | ICD-10-CM

## 2024-01-05 DIAGNOSIS — Z90.13 ACQUIRED ABSENCE OF BILATERAL BREASTS AND NIPPLES: Chronic | ICD-10-CM

## 2024-01-05 DIAGNOSIS — Z90.5 ACQUIRED ABSENCE OF KIDNEY: Chronic | ICD-10-CM

## 2024-01-05 DIAGNOSIS — Z98.890 OTHER SPECIFIED POSTPROCEDURAL STATES: Chronic | ICD-10-CM

## 2024-01-05 LAB
ALBUMIN SERPL ELPH-MCNC: 4.3 G/DL — SIGNIFICANT CHANGE UP (ref 3.3–5.2)
ALBUMIN SERPL ELPH-MCNC: 4.3 G/DL — SIGNIFICANT CHANGE UP (ref 3.3–5.2)
ALP SERPL-CCNC: 185 U/L — HIGH (ref 40–120)
ALP SERPL-CCNC: 185 U/L — HIGH (ref 40–120)
ALT FLD-CCNC: 191 U/L — HIGH
ALT FLD-CCNC: 191 U/L — HIGH
ANION GAP SERPL CALC-SCNC: 15 MMOL/L — SIGNIFICANT CHANGE UP (ref 5–17)
ANION GAP SERPL CALC-SCNC: 15 MMOL/L — SIGNIFICANT CHANGE UP (ref 5–17)
AST SERPL-CCNC: 269 U/L — HIGH
AST SERPL-CCNC: 269 U/L — HIGH
BASOPHILS # BLD AUTO: 0.07 K/UL — SIGNIFICANT CHANGE UP (ref 0–0.2)
BASOPHILS # BLD AUTO: 0.07 K/UL — SIGNIFICANT CHANGE UP (ref 0–0.2)
BASOPHILS NFR BLD AUTO: 0.6 % — SIGNIFICANT CHANGE UP (ref 0–2)
BASOPHILS NFR BLD AUTO: 0.6 % — SIGNIFICANT CHANGE UP (ref 0–2)
BILIRUB SERPL-MCNC: 0.7 MG/DL — SIGNIFICANT CHANGE UP (ref 0.4–2)
BILIRUB SERPL-MCNC: 0.7 MG/DL — SIGNIFICANT CHANGE UP (ref 0.4–2)
BUN SERPL-MCNC: 14.8 MG/DL — SIGNIFICANT CHANGE UP (ref 8–20)
BUN SERPL-MCNC: 14.8 MG/DL — SIGNIFICANT CHANGE UP (ref 8–20)
CALCIUM SERPL-MCNC: 9 MG/DL — SIGNIFICANT CHANGE UP (ref 8.4–10.5)
CALCIUM SERPL-MCNC: 9 MG/DL — SIGNIFICANT CHANGE UP (ref 8.4–10.5)
CHLORIDE SERPL-SCNC: 99 MMOL/L — SIGNIFICANT CHANGE UP (ref 96–108)
CHLORIDE SERPL-SCNC: 99 MMOL/L — SIGNIFICANT CHANGE UP (ref 96–108)
CO2 SERPL-SCNC: 24 MMOL/L — SIGNIFICANT CHANGE UP (ref 22–29)
CO2 SERPL-SCNC: 24 MMOL/L — SIGNIFICANT CHANGE UP (ref 22–29)
CREAT SERPL-MCNC: 0.7 MG/DL — SIGNIFICANT CHANGE UP (ref 0.5–1.3)
CREAT SERPL-MCNC: 0.7 MG/DL — SIGNIFICANT CHANGE UP (ref 0.5–1.3)
EGFR: 103 ML/MIN/1.73M2 — SIGNIFICANT CHANGE UP
EGFR: 103 ML/MIN/1.73M2 — SIGNIFICANT CHANGE UP
EOSINOPHIL # BLD AUTO: 0.12 K/UL — SIGNIFICANT CHANGE UP (ref 0–0.5)
EOSINOPHIL # BLD AUTO: 0.12 K/UL — SIGNIFICANT CHANGE UP (ref 0–0.5)
EOSINOPHIL NFR BLD AUTO: 1.1 % — SIGNIFICANT CHANGE UP (ref 0–6)
EOSINOPHIL NFR BLD AUTO: 1.1 % — SIGNIFICANT CHANGE UP (ref 0–6)
GLUCOSE SERPL-MCNC: 124 MG/DL — HIGH (ref 70–99)
GLUCOSE SERPL-MCNC: 124 MG/DL — HIGH (ref 70–99)
HCG SERPL-ACNC: <4 MIU/ML — SIGNIFICANT CHANGE UP
HCG SERPL-ACNC: <4 MIU/ML — SIGNIFICANT CHANGE UP
HCT VFR BLD CALC: 43.9 % — SIGNIFICANT CHANGE UP (ref 34.5–45)
HCT VFR BLD CALC: 43.9 % — SIGNIFICANT CHANGE UP (ref 34.5–45)
HGB BLD-MCNC: 14.6 G/DL — SIGNIFICANT CHANGE UP (ref 11.5–15.5)
HGB BLD-MCNC: 14.6 G/DL — SIGNIFICANT CHANGE UP (ref 11.5–15.5)
IMM GRANULOCYTES NFR BLD AUTO: 0.5 % — SIGNIFICANT CHANGE UP (ref 0–0.9)
IMM GRANULOCYTES NFR BLD AUTO: 0.5 % — SIGNIFICANT CHANGE UP (ref 0–0.9)
LIDOCAIN IGE QN: 63 U/L — HIGH (ref 22–51)
LIDOCAIN IGE QN: 63 U/L — HIGH (ref 22–51)
LYMPHOCYTES # BLD AUTO: 1.68 K/UL — SIGNIFICANT CHANGE UP (ref 1–3.3)
LYMPHOCYTES # BLD AUTO: 1.68 K/UL — SIGNIFICANT CHANGE UP (ref 1–3.3)
LYMPHOCYTES # BLD AUTO: 14.8 % — SIGNIFICANT CHANGE UP (ref 13–44)
LYMPHOCYTES # BLD AUTO: 14.8 % — SIGNIFICANT CHANGE UP (ref 13–44)
MCHC RBC-ENTMCNC: 29.6 PG — SIGNIFICANT CHANGE UP (ref 27–34)
MCHC RBC-ENTMCNC: 29.6 PG — SIGNIFICANT CHANGE UP (ref 27–34)
MCHC RBC-ENTMCNC: 33.3 GM/DL — SIGNIFICANT CHANGE UP (ref 32–36)
MCHC RBC-ENTMCNC: 33.3 GM/DL — SIGNIFICANT CHANGE UP (ref 32–36)
MCV RBC AUTO: 88.9 FL — SIGNIFICANT CHANGE UP (ref 80–100)
MCV RBC AUTO: 88.9 FL — SIGNIFICANT CHANGE UP (ref 80–100)
MONOCYTES # BLD AUTO: 0.9 K/UL — SIGNIFICANT CHANGE UP (ref 0–0.9)
MONOCYTES # BLD AUTO: 0.9 K/UL — SIGNIFICANT CHANGE UP (ref 0–0.9)
MONOCYTES NFR BLD AUTO: 7.9 % — SIGNIFICANT CHANGE UP (ref 2–14)
MONOCYTES NFR BLD AUTO: 7.9 % — SIGNIFICANT CHANGE UP (ref 2–14)
NEUTROPHILS # BLD AUTO: 8.5 K/UL — HIGH (ref 1.8–7.4)
NEUTROPHILS # BLD AUTO: 8.5 K/UL — HIGH (ref 1.8–7.4)
NEUTROPHILS NFR BLD AUTO: 75.1 % — SIGNIFICANT CHANGE UP (ref 43–77)
NEUTROPHILS NFR BLD AUTO: 75.1 % — SIGNIFICANT CHANGE UP (ref 43–77)
PLATELET # BLD AUTO: 243 K/UL — SIGNIFICANT CHANGE UP (ref 150–400)
PLATELET # BLD AUTO: 243 K/UL — SIGNIFICANT CHANGE UP (ref 150–400)
POTASSIUM SERPL-MCNC: 4.2 MMOL/L — SIGNIFICANT CHANGE UP (ref 3.5–5.3)
POTASSIUM SERPL-MCNC: 4.2 MMOL/L — SIGNIFICANT CHANGE UP (ref 3.5–5.3)
POTASSIUM SERPL-SCNC: 4.2 MMOL/L — SIGNIFICANT CHANGE UP (ref 3.5–5.3)
POTASSIUM SERPL-SCNC: 4.2 MMOL/L — SIGNIFICANT CHANGE UP (ref 3.5–5.3)
PROT SERPL-MCNC: 8 G/DL — SIGNIFICANT CHANGE UP (ref 6.6–8.7)
PROT SERPL-MCNC: 8 G/DL — SIGNIFICANT CHANGE UP (ref 6.6–8.7)
RBC # BLD: 4.94 M/UL — SIGNIFICANT CHANGE UP (ref 3.8–5.2)
RBC # BLD: 4.94 M/UL — SIGNIFICANT CHANGE UP (ref 3.8–5.2)
RBC # FLD: 13.3 % — SIGNIFICANT CHANGE UP (ref 10.3–14.5)
RBC # FLD: 13.3 % — SIGNIFICANT CHANGE UP (ref 10.3–14.5)
SODIUM SERPL-SCNC: 138 MMOL/L — SIGNIFICANT CHANGE UP (ref 135–145)
SODIUM SERPL-SCNC: 138 MMOL/L — SIGNIFICANT CHANGE UP (ref 135–145)
WBC # BLD: 11.33 K/UL — HIGH (ref 3.8–10.5)
WBC # BLD: 11.33 K/UL — HIGH (ref 3.8–10.5)
WBC # FLD AUTO: 11.33 K/UL — HIGH (ref 3.8–10.5)
WBC # FLD AUTO: 11.33 K/UL — HIGH (ref 3.8–10.5)

## 2024-01-05 PROCEDURE — 99285 EMERGENCY DEPT VISIT HI MDM: CPT

## 2024-01-05 PROCEDURE — 76705 ECHO EXAM OF ABDOMEN: CPT | Mod: 26

## 2024-01-05 PROCEDURE — 93010 ELECTROCARDIOGRAM REPORT: CPT

## 2024-01-05 PROCEDURE — 99284 EMERGENCY DEPT VISIT MOD MDM: CPT

## 2024-01-05 RX ORDER — MORPHINE SULFATE 50 MG/1
4 CAPSULE, EXTENDED RELEASE ORAL ONCE
Refills: 0 | Status: DISCONTINUED | OUTPATIENT
Start: 2024-01-05 | End: 2024-01-05

## 2024-01-05 RX ORDER — ONDANSETRON 8 MG/1
4 TABLET, FILM COATED ORAL ONCE
Refills: 0 | Status: COMPLETED | OUTPATIENT
Start: 2024-01-05 | End: 2024-01-05

## 2024-01-05 RX ORDER — KETOROLAC TROMETHAMINE 30 MG/ML
30 SYRINGE (ML) INJECTION ONCE
Refills: 0 | Status: DISCONTINUED | OUTPATIENT
Start: 2024-01-05 | End: 2024-01-05

## 2024-01-05 RX ORDER — SODIUM CHLORIDE 9 MG/ML
1000 INJECTION, SOLUTION INTRAVENOUS ONCE
Refills: 0 | Status: COMPLETED | OUTPATIENT
Start: 2024-01-05 | End: 2024-01-05

## 2024-01-05 RX ADMIN — SODIUM CHLORIDE 1000 MILLILITER(S): 9 INJECTION, SOLUTION INTRAVENOUS at 18:50

## 2024-01-05 RX ADMIN — MORPHINE SULFATE 4 MILLIGRAM(S): 50 CAPSULE, EXTENDED RELEASE ORAL at 22:58

## 2024-01-05 RX ADMIN — ONDANSETRON 4 MILLIGRAM(S): 8 TABLET, FILM COATED ORAL at 18:49

## 2024-01-05 RX ADMIN — Medication 30 MILLIGRAM(S): at 22:58

## 2024-01-05 RX ADMIN — MORPHINE SULFATE 4 MILLIGRAM(S): 50 CAPSULE, EXTENDED RELEASE ORAL at 18:49

## 2024-01-05 NOTE — CONSULT NOTE ADULT - ATTENDING COMMENTS
Patient presented with possible biliary colic event (abdominal pain that lasted ~3hrs, no pain nor TTP on exam, no leukocytosis, no evidence of inflammation on exam) - in setting of self-reported elevated transaminitis (+ mild transaminitis, but bilis wnl this presentation) being monitored by GI.   Reports similar sx ~2 months ago.   Patient presented with option of outpatient f/u for possible elective cholecystectomy, vs. admission for possible surgical intervention; opts for outpatient/elective planning.   --No further interventions indicated. To f/u as per above.   --advised to present to ER with recurrence/sustained abdominal pain, fevers, chills, PO intolerance.

## 2024-01-05 NOTE — ED PROVIDER NOTE - NSFOLLOWUPINSTRUCTIONS_ED_ALL_ED_FT
- Follow up with [facility] surgical center    Gallstones    Gallstones (cholelithiasis) is a form of gallbladder disease in which stones form in your gallbladder. The gallbladder is an organ that stores bile made in the liver, which helps digest fats. Gallstones begin as small bile crystals and slowly grow into stones. Gallstone pain occurs when the gallbladder spasms and a gallstone or sludge is blocking the duct. Pain can also occur when a stone passes out of the duct. Only take over-the-counter or prescription medicines for pain, discomfort, or fever as directed by your health care provider. Follow a low-fat diet until seen again by your health care provider.     SEEK IMMEDIATE MEDICAL CARE IF YOU HAVE ANY OF THE FOLLOWING SYMPTOMS: worsening pain, fever, persistent vomiting, yellowing of the skin or eyes, or altered mental status. - Follow up with [facility] surgical center    Gallstones    Gallstones (cholelithiasis) is a form of gallbladder disease in which stones form in your gallbladder. The gallbladder is an organ that stores bile made in the liver, which helps digest fats. Gallstones begin as small bile crystals and slowly grow into stones. Gallstone pain occurs when the gallbladder spasms and a gallstone or sludge is blocking the duct. Pain can also occur when a stone passes out of the duct. Only take over-the-counter or prescription medicines for pain, discomfort, or fever as directed by your health care provider. Follow a low-fat diet until seen again by your health care provider.     SEEK IMMEDIATE MEDICAL CARE IF YOU HAVE ANY OF THE FOLLOWING SYMPTOMS: worsening pain, fever, persistent vomiting, yellowing of the skin or eyes, or altered mental status.

## 2024-01-05 NOTE — CONSULT NOTE ADULT - ASSESSMENT
53 year old F with symptomatic cholelithiasis     Recommendations:   Outpatient follow up with acute care surgery for elective cholecystectomy   no acute intervention at this time   anticipatory guidance given, encouraged a low fat diet      D/w Dr. Reyes, attending surgeon

## 2024-01-05 NOTE — ED PROVIDER NOTE - NSFOLLOWUPCLINICS_GEN_ALL_ED_FT
[facility] Acute Care Surgery  Acute Care Surgery  [address] [place] [id number]  Phone: [phone]  Fax:      [facility] Acute Care Surgery  Acute Care Surgery  [address] [place] [id number]  Phone: [phone]  Fax:

## 2024-01-05 NOTE — ED PROVIDER NOTE - CLINICAL SUMMARY MEDICAL DECISION MAKING FREE TEXT BOX
52yo female with pmh of cholelithiasis, breast ca in remission, asthma psh hysterectomy, partial nephrectomy, breast reconstruction used abdominal tissue presents with abd pain - pt with +murphys on exam, eval for cholecystitis/lithiasis vs pancreatitis labs, US, pain control, if US neg will do CT 54yo female with pmh of cholelithiasis, breast ca in remission, asthma psh hysterectomy, partial nephrectomy, breast reconstruction used abdominal tissue presents with abd pain - pt with +murphys on exam, eval for cholecystitis/lithiasis vs pancreatitis labs, US, pain control, if US neg will do CT    Sugery consulted to LFT bump, symptomology. Surgery signed off for out patient follow up     Pt reassessed, pt feeling better at this time, vss, pt able to walk, talk and vocalized plan of action. Discussed in depth and explained to pt in depth the next steps that need to be taken including proper follow up with PCP or specialists. All incidental findings were discussed with pt as well. Pt verbalized their concerns and all questions were answered. Pt understands dispo and wants discharge. Given good instructions when to return to ED, strict return precautions and importance of f/u. 52yo female with pmh of cholelithiasis, breast ca in remission, asthma psh hysterectomy, partial nephrectomy, breast reconstruction used abdominal tissue presents with abd pain - pt with +murphys on exam, eval for cholecystitis/lithiasis vs pancreatitis labs, US, pain control, if US neg will do CT    Sugery consulted to LFT bump, symptomology. Surgery signed off for out patient follow up     Pt reassessed, pt feeling better at this time, vss, pt able to walk, talk and vocalized plan of action. Discussed in depth and explained to pt in depth the next steps that need to be taken including proper follow up with PCP or specialists. All incidental findings were discussed with pt as well. Pt verbalized their concerns and all questions were answered. Pt understands dispo and wants discharge. Given good instructions when to return to ED, strict return precautions and importance of f/u.

## 2024-01-05 NOTE — ED ADULT NURSE NOTE - OBJECTIVE STATEMENT
Pt came in with complaints of severe abdominal pain in the LUQ sudden onset today at 15:00. Complains of nausea, denies vomiting and diarrhea. Vital signs are stable except an elevated systolic BP. RR even and unlabored.

## 2024-01-05 NOTE — ED PROVIDER NOTE - WHICH SHOWED
pt with no obvious arrhythmia on ekg such as wpw/burgada/avrt/afib/aflutter/svt/heart block/longqt no acute ischemic findings

## 2024-01-05 NOTE — ED ADULT NURSE NOTE - NSFALLUNIVINTERV_ED_ALL_ED
Bed/Stretcher in lowest position, wheels locked, appropriate side rails in place/Call bell, personal items and telephone in reach/Instruct patient to call for assistance before getting out of bed/chair/stretcher/Non-slip footwear applied when patient is off stretcher/[place] to call system/Physically safe environment - no spills, clutter or unnecessary equipment/Purposeful proactive rounding/Room/bathroom lighting operational, light cord in reach Bed/Stretcher in lowest position, wheels locked, appropriate side rails in place/Call bell, personal items and telephone in reach/Instruct patient to call for assistance before getting out of bed/chair/stretcher/Non-slip footwear applied when patient is off stretcher/[place] to call system/Physically safe environment - no spills, clutter or unnecessary equipment/Purposeful proactive rounding/Room/bathroom lighting operational, light cord in reach

## 2024-01-05 NOTE — ED PROVIDER NOTE - PHYSICAL EXAMINATION
Const: Awake, alert and oriented. In no acute distress. Well appearing.  HEENT: NC/AT. Moist mucous membranes.  Eyes: No scleral icterus. EOMI.  Neck:. Soft and supple. Full ROM without pain.  Cardiac: Regular rate and regular rhythm. +S1/S2. Peripheral pulses 2+ and symmetric. No LE edema.  Resp: Speaking in full sentences. No evidence of respiratory distress. No wheezes, rales or rhonchi.  Abd: Soft, ttp RUQ, +[place], non-distended. Normal bowel sounds in all 4 quadrants. No guarding or rebound.  Back: Spine midline and non-tender. No CVAT.  Skin: No rashes, abrasions or lacerations.  Lymph: No cervical lymphadenopathy.  Neuro: Awake, alert & oriented x 3. Moves all extremities symmetrically. Const: Awake, alert and oriented. In no acute distress. Well appearing.  HEENT: NC/AT. Moist mucous membranes.  Eyes: No scleral icterus. EOMI.  Neck:. Soft and supple. Full ROM without pain.  Cardiac: Regular rate and regular rhythm. +S1/S2. Peripheral pulses 2+ and symmetric. No LE edema.  Resp: Speaking in full sentences. No evidence of respiratory distress. No wheezes, rales or rhonchi.  Abd: Soft, ttp RUQ, +[place], non-distended. Normal bowel sounds in all 4 quadrants. No guarding or rebound.  Back: Spine midline and non-tender. No CVAT.  Skin: No rashes, abrasions or lacerations.  Lymph: No cervical lymphadenopathy.  Neuro: Awake, alert & oriented x 3. Moves all extremities symmetrically.

## 2024-01-05 NOTE — CONSULT NOTE ADULT - SUBJECTIVE AND OBJECTIVE BOX
SURGERY CONSULT    HPI:  53F, PMH of Cholelithiasis, breast cancer, status post mastectomy and SEAN reconstructions, hysterectomy, asthma, partial nephrectomy, presenting with a few hours of abdominal pain after eating peanut butter, associated with nausea, the pain resolved, no vomiting, no fevers or chills.  She of note has been worked up recently by a gastroenterologist for elevated liver enzymes with c/f fatty liver disease, no additional complaints.        PAST MEDICAL HISTORY:  Bronchitis    Sinus infection    Breast cancer    Asthma    Kidney tumor (benign), left        PAST SURGICAL HISTORY:  S/P mastectomy, bilateral    Kidney tumor (benign), left    S/P lumbar discectomy    H/O partial nephrectomy        ALLERGIES:  iodinated radiocontrast agents (Other)  Levaquin (Rash)  penicillin (Rash)  aspirin (Hives)  Contac 12 Hour Allergy (Angioedema)      FAMILY HISTORY: Noncontributory    SOCIAL HISTORY: Denies tobacco, EtOH, illicit substance use.     HOME MEDICATIONS:    MEDICATIONS  (STANDING):    MEDICATIONS  (PRN):      VITALS & I/Os:  Vital Signs Last 24 Hrs  T(C): 36.3 (05 Jan 2024 17:39), Max: 36.3 (05 Jan 2024 17:39)  T(F): 97.4 (05 Jan 2024 17:39), Max: 97.4 (05 Jan 2024 17:39)  HR: 98 (05 Jan 2024 17:39) (98 - 98)  BP: 176/96 (05 Jan 2024 17:39) (176/96 - 176/96)  BP(mean): --  RR: 18 (05 Jan 2024 17:39) (18 - 18)  SpO2: 100% (05 Jan 2024 17:39) (100% - 100%)    Parameters below as of 05 Jan 2024 17:39  Patient On (Oxygen Delivery Method): room air      CAPILLARY BLOOD GLUCOSE          I&O's Summary      GENERAL: Alert, well developed, in no acute distress.  RESPIRATORY: CTAB. No wheezing, rales or rhonchi.  CARDIOVASCULAR: RRR. No audible murmurs, rubs or gallops.   GASTROINTESTINAL: Abdomen soft, NT, ND, -R/-G.  No pulsatile mass, no flank tenderness or suprapubic tenderness. No hepatosplenomegaly.      LABS:                        14.6   11.33 )-----------( 243      ( 05 Jan 2024 18:56 )             43.9     01-05    138  |  99  |  14.8  ----------------------------<  124<H>  4.2   |  24.0  |  0.70    Ca    9.0      05 Jan 2024 18:56    TPro  8.0  /  Alb  4.3  /  TBili  0.7  /  DBili  x   /  AST  269<H>  /  ALT  191<H>  /  AlkPhos  185<H>  01-05    Lactate:              Urinalysis Basic - ( 05 Jan 2024 18:56 )    Color: x / Appearance: x / SG: x / pH: x  Gluc: 124 mg/dL / Ketone: x  / Bili: x / Urobili: x   Blood: x / Protein: x / Nitrite: x   Leuk Esterase: x / RBC: x / WBC x   Sq Epi: x / Non Sq Epi: x / Bacteria: x        IMAGING:    FINDINGS:  Liver: Increased echogenicity.  Bile ducts: Normal caliber. Common bile duct not visualized.  Gallbladder: Cholelithiasis.  Pancreas: Poorly visualized.  Right kidney: 10.2 cm. No hydronephrosis.  Ascites: None.  IVC: Visualized portions are within normal limits.    IMPRESSION:  Cholelithiasis without secondary findings of acute cholecystitis. However, the patient was premedicated, limiting evaluation for sonographic Curtis's sign.    Diffuse hepatic steatosis  FINDINGS:  Liver: Increased echogenicity.  Bile ducts: Normal caliber. Common bile duct not visualized.  Gallbladder: Cholelithiasis.  Pancreas: Poorly visualized.  Right kidney: 10.2 cm. No hydronephrosis.  Ascites: None.  IVC: Visualized portions are within normal limits.    IMPRESSION:  Cholelithiasis without secondary findings of acute cholecystitis. However, the patient was premedicated, limiting evaluation for sonographic Curtis's sign.    Diffuse hepatic steatosis

## 2024-01-05 NOTE — ED PROVIDER NOTE - PATIENT PORTAL LINK FT
You can access the FollowMyHealth Patient Portal offered by [facility] by registering at the following website: [email or web address]. By joining [employer]’s FollowMyHealth portal, you will also be able to view your health information using other applications (apps) compatible with our system. You can access the FollowMyHealth Patient Portal offered by [facility] by registering at the following website: [email or web address]. By joining [employer]’s FollowMyHealth portal, you will also be able to view your health information using other applications (apps) compatible with our system.

## 2024-01-05 NOTE — ED PROVIDER NOTE - OBJECTIVE STATEMENT
52yo female with pmh of cholelithiasis, breast ca in remission, asthma psh hysterectomy, partial nephrectomy, breast reconstruction used abdominal tissue presents with abd pain. Pt states she had peanut butter earlier and shortly after started to have abd pain a/w nausea no vomiting. Pt states similar to the pain she has when she was here in november and told it may be her gallbladder. Pt denies fevers/chills, ha, loc, focal neuro deficits, cp/sob/palp, cough, v/d, urinary symptoms, recent travel 54yo female with pmh of cholelithiasis, breast ca in remission, asthma psh hysterectomy, partial nephrectomy, breast reconstruction used abdominal tissue presents with abd pain. Pt states she had peanut butter earlier and shortly after started to have abd pain a/w nausea no vomiting. Pt states similar to the pain she has when she was here in november and told it may be her gallbladder. Pt denies fevers/chills, ha, loc, focal neuro deficits, cp/sob/palp, cough, v/d, urinary symptoms, recent travel

## 2024-01-05 NOTE — ED ADULT NURSE REASSESSMENT NOTE - NS ED NURSE REASSESS COMMENT FT1
Report received from SAMY España in [facility].  Patient awaiting surgical consult for gallstones.  Patient resting comfortably in stretcher in NAD, respirations even and unlabored.  Will continue with plan of care. Report received from SAMY España in [facility].  Patient awaiting surgical consult for gallstones.  Patient resting comfortably in stretcher in NAD, respirations even and unlabored.  Will continue with plan of care.

## 2024-01-05 NOTE — ED ADULT TRIAGE NOTE - CHIEF COMPLAINT QUOTE
Pt presents to ED c/o abdominal pain that began at 15:00 today. Seen in ED for the same complaint in November and told it could be her gall bladder. C/o nausea.

## 2024-01-06 PROCEDURE — 96375 TX/PRO/DX INJ NEW DRUG ADDON: CPT

## 2024-01-06 PROCEDURE — 99285 EMERGENCY DEPT VISIT HI MDM: CPT | Mod: 25

## 2024-01-06 PROCEDURE — 80053 COMPREHEN METABOLIC PANEL: CPT

## 2024-01-06 PROCEDURE — 85025 COMPLETE CBC W/AUTO DIFF WBC: CPT

## 2024-01-06 PROCEDURE — 36415 COLL VENOUS BLD VENIPUNCTURE: CPT

## 2024-01-06 PROCEDURE — 84702 CHORIONIC GONADOTROPIN TEST: CPT

## 2024-01-06 PROCEDURE — 83690 ASSAY OF LIPASE: CPT

## 2024-01-06 PROCEDURE — 76705 ECHO EXAM OF ABDOMEN: CPT

## 2024-01-06 PROCEDURE — 93005 ELECTROCARDIOGRAM TRACING: CPT

## 2024-01-06 PROCEDURE — 96374 THER/PROPH/DIAG INJ IV PUSH: CPT

## 2024-01-12 ENCOUNTER — APPOINTMENT (OUTPATIENT)
Dept: FAMILY MEDICINE | Facility: CLINIC | Age: 54
End: 2024-01-12
Payer: COMMERCIAL

## 2024-01-12 ENCOUNTER — LABORATORY RESULT (OUTPATIENT)
Age: 54
End: 2024-01-12

## 2024-01-12 VITALS
SYSTOLIC BLOOD PRESSURE: 120 MMHG | OXYGEN SATURATION: 98 % | DIASTOLIC BLOOD PRESSURE: 70 MMHG | RESPIRATION RATE: 14 BRPM | WEIGHT: 237 LBS | HEIGHT: 66.5 IN | HEART RATE: 81 BPM | BODY MASS INDEX: 37.64 KG/M2

## 2024-01-12 DIAGNOSIS — J39.2 OTHER DISEASES OF PHARYNX: ICD-10-CM

## 2024-01-12 DIAGNOSIS — R11.2 NAUSEA WITH VOMITING, UNSPECIFIED: ICD-10-CM
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
## 2024-01-12 DIAGNOSIS — Z81.8 FAMILY HISTORY OF OTHER MENTAL AND BEHAVIORAL DISORDERS: ICD-10-CM

## 2024-01-12 DIAGNOSIS — Z00.00 ENCOUNTER FOR GENERAL ADULT MEDICAL EXAMINATION W/OUT ABNORMAL FINDINGS: ICD-10-CM

## 2024-01-12 PROCEDURE — 36415 COLL VENOUS BLD VENIPUNCTURE: CPT

## 2024-01-12 PROCEDURE — 99396 PREV VISIT EST AGE 40-64: CPT | Mod: 25

## 2024-01-12 RX ORDER — COVID-19 ANTIGEN TEST
KIT MISCELLANEOUS
Qty: 5 | Refills: 0 | Status: DISCONTINUED | COMMUNITY
Start: 2022-09-27 | End: 2024-01-12

## 2024-01-12 RX ORDER — ACETAMINOPHEN 325 MG
TABLET ORAL
Refills: 0 | Status: DISCONTINUED | COMMUNITY
End: 2024-01-12

## 2024-01-12 RX ORDER — PREDNISONE 50 MG/1
50 TABLET ORAL
Qty: 3 | Refills: 0 | Status: DISCONTINUED | COMMUNITY
Start: 2023-11-14 | End: 2024-01-12

## 2024-01-12 RX ORDER — HYDROXYZINE HYDROCHLORIDE 50 MG/1
50 TABLET ORAL
Qty: 90 | Refills: 3 | Status: DISCONTINUED | COMMUNITY
Start: 2018-11-25 | End: 2024-01-12

## 2024-01-12 RX ORDER — BUDESONIDE AND FORMOTEROL FUMARATE DIHYDRATE 160; 4.5 UG/1; UG/1
160-4.5 AEROSOL RESPIRATORY (INHALATION) TWICE DAILY
Qty: 3 | Refills: 1 | Status: DISCONTINUED | COMMUNITY
Start: 2022-10-18 | End: 2024-01-12

## 2024-01-12 RX ORDER — LORATADINE 10 MG
17 TABLET,DISINTEGRATING ORAL
Refills: 0 | Status: ACTIVE | COMMUNITY

## 2024-01-12 RX ORDER — CYCLOBENZAPRINE HYDROCHLORIDE 10 MG/1
10 TABLET, FILM COATED ORAL
Qty: 60 | Refills: 1 | Status: DISCONTINUED | COMMUNITY
Start: 2018-12-19 | End: 2024-01-12

## 2024-01-12 RX ORDER — ALBUTEROL SULFATE 90 UG/1
108 (90 BASE) AEROSOL, METERED RESPIRATORY (INHALATION)
Qty: 3 | Refills: 3 | Status: ACTIVE | COMMUNITY
Start: 1900-01-01 | End: 1900-01-01

## 2024-01-14 ENCOUNTER — NON-APPOINTMENT (OUTPATIENT)
Age: 54
End: 2024-01-14
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
## 2024-01-15 PROBLEM — R11.2 NAUSEA AND VOMITING IN ADULT: Status: RESOLVED | Noted: 2023-11-13 | Resolved: 2024-01-15

## 2024-01-15 PROBLEM — J39.2 THROAT IRRITATION: Status: RESOLVED | Noted: 2023-03-09 | Resolved: 2024-01-15

## 2024-01-15 NOTE — PLAN
[FreeTextEntry1] : VISION SCREENING SAFETY / HEALTHY HABITS REVIEWED HEALTHY DIET AND LIFESTYLE MODIFICATIONS VACCINATIONS DISCUSSED: COVID, FLU, TDAP, SHINGRIX BONE DENSITY CHECK ROUTINE LAB WORK WILL BE HAVING UPCOMING COLONOSCOPY FOLLOW-UP ALL SPECIALISTS AS DIRECTED CALL WITH ANY QUESTIONS, CONCERNS OR CHANGES

## 2024-01-15 NOTE — HEALTH RISK ASSESSMENT
[Good] : ~his/her~ current health as good [Excellent] : ~his/her~  mood as  excellent [No] : In the past 12 months have you used drugs other than those required for medical reasons? No [Little interest or pleasure doing things] : 1) Little interest or pleasure doing things [Feeling down, depressed, or hopeless] : 2) Feeling down, depressed, or hopeless [0] : 2) Feeling down, depressed, or hopeless: Not at all (0) [PHQ-2 Negative - No further assessment needed] : PHQ-2 Negative - No further assessment needed [HIV test declined] : HIV test declined [Hepatitis C test declined] : Hepatitis C test declined [None] : None [With Family] : lives with family [# of Members in Household ___] :  household currently consist of [unfilled] member(s) [Employed] : employed [] :  [# Of Children ___] : has [unfilled] children [Former] : Former [5-9] : 5-9 [> 15 Years] : > 15 Years [College] : College [Feels Safe at Home] : Feels safe at home [Reports normal functional visual acuity (ie: able to read med bottle)] : Reports normal functional visual acuity [Smoke Detector] : smoke detector [Carbon Monoxide Detector] : carbon monoxide detector [Safety elements used in home] : safety elements used in home [Seat Belt] :  uses seat belt [Sunscreen] : uses sunscreen [With Patient/Caregiver] : , with patient/caregiver [Designated Healthcare Proxy] : Designated healthcare proxy [Name: ___] : Health Care Proxy's Name: [unfilled]  [Relationship: ___] : Relationship: [unfilled] [de-identified] : LAST FRIDAY FOR GALLBLADDER AND NOVEMBER AS WELL [de-identified] : NOT ROUTINELY EXERCISING NOW DUE TO BURSITIS [de-identified] : WATCHING DIET.  VERY LOW FAT. STOPPED DRINKING ALCOHOL, LEAN PROTEIN [Change in mental status noted] : No change in mental status noted [Language] : denies difficulty with language [Learning/Retaining New Information] : denies difficulty learning/retaining new information [Handling Complex Tasks] : denies difficulty handling complex tasks [Reports changes in vision] : Reports no changes in vision [Reports changes in dental health] : Reports no changes in dental health [MammogramComments] : BILATERAL MASTECTOMY [PapSmearComments] : HYSTERECTOMY [ColonoscopyDate] : 01/23 [ColonoscopyComments] : LAST YEAR BUT INCOMPLETE PREP; TO BE DONE NEXT MONTH [de-identified] : HEARING LOSS RIGHT EAR - SAW ENT DR. GUILLEN AND HAD AUDIOLOGY EVALUATION AND MRI [AdvancecareDate] : 01/24 [de-identified] : QUIT 19 YEARS

## 2024-01-15 NOTE — HISTORY OF PRESENT ILLNESS
[FreeTextEntry1] : physical [de-identified] : MS. MARTIN IS A PLEASANT 54 YO PRESENTING FOR PHYSICAL.  HAD MRCP AT [facility] [place]UE TO ABDOMINAL DISCOMFORT.  WAS   FOUND TO HAVE A LESION ON RIGHT KIDNEY.  IS REPEATING IMAGING IN SIX MONTHS.  ALSO HAD A LIVER SCAN PERFORMED.  STOPPED DRINKING ALCOHOL. SAW SURGERY FOR GALLBLADDER EVALUATION.  TO FOLLOW-UP WITH A SURGEON AT [facility] - UPCOMING APPOINTMENT.  HISTORY OF ASTHMA.  RARE INHALER USE.  WHEN SICK WILL USE OR STRENUOUS ACTIVITY. CARDIOLOGY: SEEN [facility]; HAD EKG, ECHO ORTHOPEDICS: HOSPITAL SPECIAL SURGERY.  BURSITIS LEFT AND RIGHT HIP - TO HAVE INJECTION

## 2024-01-17 ENCOUNTER — NON-APPOINTMENT (OUTPATIENT)
Age: 54
End: 2024-01-17

## 2024-01-18 LAB
ALBUMIN SERPL ELPH-MCNC: 4.6 G/DL
ALP BLD-CCNC: 132 U/L
ALT SERPL-CCNC: 101 U/L
ANION GAP SERPL CALC-SCNC: 15 MMOL/L
APPEARANCE: CLEAR
AST SERPL-CCNC: 30 U/L
BILIRUB SERPL-MCNC: 0.4 MG/DL
BILIRUBIN URINE: NEGATIVE
BLOOD URINE: NEGATIVE
BUN SERPL-MCNC: 12 MG/DL
CALCIUM SERPL-MCNC: 9.7 MG/DL
CHLORIDE SERPL-SCNC: 104 MMOL/L
CHOLEST SERPL-MCNC: 183 MG/DL
CO2 SERPL-SCNC: 21 MMOL/L
COLOR: YELLOW
CREAT SERPL-MCNC: 0.84 MG/DL
EGFR: 83 ML/MIN/1.73M2
ESTIMATED AVERAGE GLUCOSE: 120 MG/DL
GLUCOSE QUALITATIVE U: NEGATIVE MG/DL
GLUCOSE SERPL-MCNC: 99 MG/DL
HBA1C MFR BLD HPLC: 5.8 %
HDLC SERPL-MCNC: 43 MG/DL
KETONES URINE: NEGATIVE MG/DL
LDLC SERPL CALC-MCNC: 112 MG/DL
LEUKOCYTE ESTERASE URINE: ABNORMAL
NITRITE URINE: NEGATIVE
NONHDLC SERPL-MCNC: 140 MG/DL
PH URINE: 5.5
POTASSIUM SERPL-SCNC: 4.3 MMOL/L
PROT SERPL-MCNC: 7.9 G/DL
PROTEIN URINE: NEGATIVE MG/DL
SODIUM SERPL-SCNC: 140 MMOL/L
SPECIFIC GRAVITY URINE: 1.02
T4 FREE SERPL-MCNC: 1.1 NG/DL
TRIGL SERPL-MCNC: 157 MG/DL
TSH SERPL-ACNC: 2.18 UIU/ML
UROBILINOGEN URINE: 0.2 MG/DL

## 2024-01-19 ENCOUNTER — NON-APPOINTMENT (OUTPATIENT)
Age: 54
End: 2024-01-19

## 2024-02-07 ENCOUNTER — NON-APPOINTMENT (OUTPATIENT)
Age: 54
End: 2024-02-07

## 2024-02-14 NOTE — H&P PST ADULT - TEMPERATURE IN CELSIUS (DEGREES C)
review of laboratory data, radiology results, discussion with primary team\patient, discussion with consulting services, and monitoring for potential decompensation. Interventions were performed as documented above. Critically ill patient requiring frequent bedside visits with therapy changes. 37.4

## 2024-04-05 ENCOUNTER — OFFICE (OUTPATIENT)
Dept: URBAN - METROPOLITAN AREA CLINIC 113 | Facility: CLINIC | Age: 54
Setting detail: OPHTHALMOLOGY
End: 2024-04-05
Payer: COMMERCIAL

## 2024-04-05 DIAGNOSIS — H10.45: ICD-10-CM

## 2024-04-05 DIAGNOSIS — H40.053: ICD-10-CM

## 2024-04-05 DIAGNOSIS — H16.223: ICD-10-CM

## 2024-04-05 DIAGNOSIS — H40.013: ICD-10-CM

## 2024-04-05 PROCEDURE — 92083 EXTENDED VISUAL FIELD XM: CPT | Performed by: OPHTHALMOLOGY

## 2024-04-05 PROCEDURE — 92250 FUNDUS PHOTOGRAPHY W/I&R: CPT | Performed by: OPHTHALMOLOGY

## 2024-04-05 PROCEDURE — 92014 COMPRE OPH EXAM EST PT 1/>: CPT | Performed by: OPHTHALMOLOGY

## 2024-04-14 ENCOUNTER — EMERGENCY (EMERGENCY)
Facility: HOSPITAL | Age: 54
LOS: 1 days | Discharge: HOME | End: 2024-04-14
Attending: EMERGENCY MEDICINE
Payer: COMMERCIAL

## 2024-04-14 VITALS
RESPIRATION RATE: 20 BRPM | OXYGEN SATURATION: 100 % | HEART RATE: 78 BPM | SYSTOLIC BLOOD PRESSURE: 135 MMHG | DIASTOLIC BLOOD PRESSURE: 71 MMHG | TEMPERATURE: 98 F

## 2024-04-14 VITALS
WEIGHT: 247.14 LBS | HEART RATE: 87 BPM | DIASTOLIC BLOOD PRESSURE: 87 MMHG | RESPIRATION RATE: 18 BRPM | SYSTOLIC BLOOD PRESSURE: 152 MMHG | TEMPERATURE: 98 F | OXYGEN SATURATION: 99 % | HEIGHT: 67 IN

## 2024-04-14 DIAGNOSIS — D30.02 BENIGN NEOPLASM OF LEFT KIDNEY: Chronic | ICD-10-CM

## 2024-04-14 DIAGNOSIS — Z90.13 ACQUIRED ABSENCE OF BILATERAL BREASTS AND NIPPLES: Chronic | ICD-10-CM

## 2024-04-14 DIAGNOSIS — Z90.5 ACQUIRED ABSENCE OF KIDNEY: Chronic | ICD-10-CM

## 2024-04-14 DIAGNOSIS — Z98.890 OTHER SPECIFIED POSTPROCEDURAL STATES: Chronic | ICD-10-CM

## 2024-04-14 LAB
ALBUMIN SERPL ELPH-MCNC: 4 G/DL — SIGNIFICANT CHANGE UP (ref 3.3–5.2)
ALP SERPL-CCNC: 84 U/L — SIGNIFICANT CHANGE UP (ref 40–120)
ALT FLD-CCNC: 33 U/L — HIGH
ANION GAP SERPL CALC-SCNC: 13 MMOL/L — SIGNIFICANT CHANGE UP (ref 5–17)
AST SERPL-CCNC: 29 U/L — SIGNIFICANT CHANGE UP
BASOPHILS # BLD AUTO: 0.08 K/UL — SIGNIFICANT CHANGE UP (ref 0–0.2)
BASOPHILS NFR BLD AUTO: 0.7 % — SIGNIFICANT CHANGE UP (ref 0–2)
BILIRUB SERPL-MCNC: <0.2 MG/DL — LOW (ref 0.4–2)
BUN SERPL-MCNC: 16.8 MG/DL — SIGNIFICANT CHANGE UP (ref 8–20)
CALCIUM SERPL-MCNC: 9 MG/DL — SIGNIFICANT CHANGE UP (ref 8.4–10.5)
CHLORIDE SERPL-SCNC: 105 MMOL/L — SIGNIFICANT CHANGE UP (ref 96–108)
CO2 SERPL-SCNC: 24 MMOL/L — SIGNIFICANT CHANGE UP (ref 22–29)
CREAT SERPL-MCNC: 0.75 MG/DL — SIGNIFICANT CHANGE UP (ref 0.5–1.3)
EGFR: 95 ML/MIN/1.73M2 — SIGNIFICANT CHANGE UP
EOSINOPHIL # BLD AUTO: 0.29 K/UL — SIGNIFICANT CHANGE UP (ref 0–0.5)
EOSINOPHIL NFR BLD AUTO: 2.7 % — SIGNIFICANT CHANGE UP (ref 0–6)
GLUCOSE SERPL-MCNC: 89 MG/DL — SIGNIFICANT CHANGE UP (ref 70–99)
HCT VFR BLD CALC: 41 % — SIGNIFICANT CHANGE UP (ref 34.5–45)
HGB BLD-MCNC: 13.2 G/DL — SIGNIFICANT CHANGE UP (ref 11.5–15.5)
IMM GRANULOCYTES NFR BLD AUTO: 0.3 % — SIGNIFICANT CHANGE UP (ref 0–0.9)
LIDOCAIN IGE QN: 45 U/L — SIGNIFICANT CHANGE UP (ref 22–51)
LYMPHOCYTES # BLD AUTO: 39.2 % — SIGNIFICANT CHANGE UP (ref 13–44)
LYMPHOCYTES # BLD AUTO: 4.21 K/UL — HIGH (ref 1–3.3)
MCHC RBC-ENTMCNC: 28.7 PG — SIGNIFICANT CHANGE UP (ref 27–34)
MCHC RBC-ENTMCNC: 32.2 GM/DL — SIGNIFICANT CHANGE UP (ref 32–36)
MCV RBC AUTO: 89.1 FL — SIGNIFICANT CHANGE UP (ref 80–100)
MONOCYTES # BLD AUTO: 0.8 K/UL — SIGNIFICANT CHANGE UP (ref 0–0.9)
MONOCYTES NFR BLD AUTO: 7.4 % — SIGNIFICANT CHANGE UP (ref 2–14)
NEUTROPHILS # BLD AUTO: 5.34 K/UL — SIGNIFICANT CHANGE UP (ref 1.8–7.4)
NEUTROPHILS NFR BLD AUTO: 49.7 % — SIGNIFICANT CHANGE UP (ref 43–77)
NT-PROBNP SERPL-SCNC: 96 PG/ML — SIGNIFICANT CHANGE UP (ref 0–300)
PLATELET # BLD AUTO: 249 K/UL — SIGNIFICANT CHANGE UP (ref 150–400)
POTASSIUM SERPL-MCNC: 4.3 MMOL/L — SIGNIFICANT CHANGE UP (ref 3.5–5.3)
POTASSIUM SERPL-SCNC: 4.3 MMOL/L — SIGNIFICANT CHANGE UP (ref 3.5–5.3)
PROT SERPL-MCNC: 7.2 G/DL — SIGNIFICANT CHANGE UP (ref 6.6–8.7)
RBC # BLD: 4.6 M/UL — SIGNIFICANT CHANGE UP (ref 3.8–5.2)
RBC # FLD: 14 % — SIGNIFICANT CHANGE UP (ref 10.3–14.5)
SODIUM SERPL-SCNC: 142 MMOL/L — SIGNIFICANT CHANGE UP (ref 135–145)
TROPONIN T, HIGH SENSITIVITY RESULT: <6 NG/L — SIGNIFICANT CHANGE UP (ref 0–51)
WBC # BLD: 10.75 K/UL — HIGH (ref 3.8–10.5)
WBC # FLD AUTO: 10.75 K/UL — HIGH (ref 3.8–10.5)

## 2024-04-14 PROCEDURE — 71045 X-RAY EXAM CHEST 1 VIEW: CPT

## 2024-04-14 PROCEDURE — 83690 ASSAY OF LIPASE: CPT

## 2024-04-14 PROCEDURE — 93010 ELECTROCARDIOGRAM REPORT: CPT

## 2024-04-14 PROCEDURE — 85025 COMPLETE CBC W/AUTO DIFF WBC: CPT

## 2024-04-14 PROCEDURE — 36415 COLL VENOUS BLD VENIPUNCTURE: CPT

## 2024-04-14 PROCEDURE — 83880 ASSAY OF NATRIURETIC PEPTIDE: CPT

## 2024-04-14 PROCEDURE — 71045 X-RAY EXAM CHEST 1 VIEW: CPT | Mod: 26

## 2024-04-14 PROCEDURE — 99285 EMERGENCY DEPT VISIT HI MDM: CPT | Mod: 25

## 2024-04-14 PROCEDURE — 80053 COMPREHEN METABOLIC PANEL: CPT

## 2024-04-14 PROCEDURE — 99285 EMERGENCY DEPT VISIT HI MDM: CPT

## 2024-04-14 PROCEDURE — 93005 ELECTROCARDIOGRAM TRACING: CPT

## 2024-04-14 PROCEDURE — 84484 ASSAY OF TROPONIN QUANT: CPT

## 2024-04-14 NOTE — ED ADULT TRIAGE NOTE - CHIEF COMPLAINT QUOTE
Pt CO sternal CP radiating to the shoulder blade that feels sharp x 1 day. Pt states palpitations over the last couple days. States dyspnea on exertion. Denies additional complaints @ this time.

## 2024-04-14 NOTE — ED PROVIDER NOTE - PATIENT PORTAL LINK FT
You can access the FollowMyHealth Patient Portal offered by [facility] by registering at the following website: [email or web address]. By joining [employer]’s FollowMyHealth portal, you will also be able to view your health information using other applications (apps) compatible with our system.

## 2024-04-14 NOTE — ED ADULT TRIAGE NOTE - GLASGOW COMA SCALE: BEST VERBAL RESPONSE, MLM
Change dressing once per day    Keep finger splinted.   Minoxidil Pregnancy And Lactation Text: This medication has not been assigned a Pregnancy Risk Category but animal studies failed to show danger with the topical medication. It is unknown if the medication is excreted in breast milk. (V5) oriented

## 2024-04-14 NOTE — ED PROVIDER NOTE - NSFOLLOWUPINSTRUCTIONS_ED_ALL_ED_FT
Follow up with your cardiologist.    Come back for any new or worsening symptoms.    Nonspecific Chest Pain, Adult  Chest pain is an uncomfortable, tight, or painful feeling in the chest. The pain can feel like a crushing, aching, or squeezing pressure. A person can feel a burning or tingling sensation. Chest pain can also be felt in your back, neck, jaw, shoulder, or arm. This pain can be worse when you move, sneeze, or take a deep breath.    Chest pain can be caused by a condition that is life-threatening. This must be treated right away. It can also be caused by something that is not life-threatening. If you have chest pain, it can be hard to know the difference, so it is important to get help right away to make sure that you do not have a serious condition.    Some life-threatening causes of chest pain include:  Heart attack.  A tear in the body's main blood vessel (aortic dissection).  Inflammation around your heart (pericarditis).  A problem in the lungs, such as a blood clot (pulmonary embolism) or a collapsed lung (pneumothorax).  Some non life-threatening causes of chest pain include:  Heartburn.  Anxiety or stress.  Damage to the bones, muscles, and cartilage that make up your chest wall.  Pneumonia or bronchitis.  Shingles infection (varicella-zoster virus).  Your chest pain may come and go. It may also be constant. Your health care provider will do tests and other studies to find the cause of your pain. Treatment will depend on the cause of your chest pain.    Follow these instructions at home:  Medicines    Take over-the-counter and prescription medicines only as told by your health care provider.  If you were prescribed an antibiotic medicine, take it as told by your health care provider. Do not stop taking the antibiotic even if you start to feel better.  Activity    Avoid any activities that cause chest pain.  Do not lift anything that is heavier than 10 lb (4.5 kg), or the limit that you are told, until your health care provider says that it is safe.  Rest as directed by your health care provider.  Return to your normal activities only as told by your health care provider. Ask your health care provider what activities are safe for you.  Lifestyle    A plate along with examples of foods in a healthy diet.  Silhouette of a person sitting on the floor doing yoga.  Do not use any products that contain nicotine or tobacco, such as cigarettes, e-cigarettes, and chewing tobacco. If you need help quitting, ask your health care provider.  Do not drink alcohol.  Make healthy lifestyle changes as recommended. These may include:  Getting regular exercise. Ask your health care provider to suggest some exercises that are safe for you.  Eating a heart-healthy diet. This includes plenty of fresh fruits and vegetables, whole grains, low-fat (lean) protein, and low-fat dairy products. A dietitian can help you find healthy eating options.  Maintaining a healthy weight.  Managing any other health conditions you may have, such as high blood pressure (hypertension) or diabetes.  Reducing stress, such as with yoga or relaxation techniques.  General instructions    Pay attention to any changes in your symptoms.  It is up to you to get the results of any tests that were done. Ask your health care provider, or the department that is doing the tests, when your results will be ready.  Keep all follow-up visits as told by your health care provider. This is important.  You may be asked to go for further testing if your chest pain does not go away.  Contact a health care provider if:  Your chest pain does not go away.  You feel depressed.  You have a fever.  You notice changes in your symptoms or develop new symptoms.  Get help right away if:  Your chest pain gets worse.  You have a cough that gets worse, or you cough up blood.  You have severe pain in your abdomen.  You faint.  You have sudden, unexplained chest discomfort.  You have sudden, unexplained discomfort in your arms, back, neck, or jaw.  You have shortness of breath at any time.  You suddenly start to sweat, or your skin gets clammy.  You feel nausea or you vomit.  You suddenly feel lightheaded or dizzy.  You have severe weakness, or unexplained weakness or fatigue.  Your heart begins to beat quickly, or it feels like it is skipping beats.  These symptoms may represent a serious problem that is an emergency. Do not wait to see if the symptoms will go away. Get medical help right away. Call your local emergency services (911 in the [place] Do not drive yourself to the hospital.    Summary  Chest pain can be caused by a condition that is serious and requires urgent treatment. It may also be caused by something that is not life-threatening.  Your health care provider may do lab tests and other studies to find the cause of your pain.  Follow your health care provider's instructions on taking medicines, making lifestyle changes, and getting emergency treatment if symptoms become worse.  Keep all follow-up visits as told by your health care provider. This includes visits for any further testing if your chest pain does not go away.  This information is not intended to replace advice given to you by your health care provider. Make sure you discuss any questions you have with your health care provider.

## 2024-04-14 NOTE — ED PROVIDER NOTE - OBJECTIVE STATEMENT
53 y f with pmh of cholelithiasis, breast ca in remission (Last chemo or radiation was 2009, no current hormone therapy), asthma, hysterectomy, partial nephrectomy, breast reconstruction  presenting for stabbing chest pain that started 3 hours ago.  Reported small shortness of breath while walking up the stairs with laundry but no difficulty walking.  When she came into the hospital.  Denying any abdominal pain, diaphoresis, abdominal pain, radiation to jaw, previous heart attacks.  Last saw a cardiologist a year ago and got echo which she is uncertain of results of. No recent travel, LE swelling, ho blood clots, family ho cardiac issues at young age. 53 y f with pmh of cholelithiasis, breast ca in remission (Last chemo or radiation was 2009, no current hormone therapy), asthma, hysterectomy, partial nephrectomy, breast reconstruction  presenting for stabbing chest pain that started 3 hours ago.  Reported small shortness of breath while walking up the stairs with laundry but no difficulty walking when she came into the hospital.  Denying any abdominal pain, diaphoresis, abdominal pain, radiation to jaw, previous heart attacks.  Last saw a cardiologist a year ago and got echo which she is uncertain of results of. No recent travel, LE swelling, ho blood clots, family ho cardiac issues at young age.

## 2024-04-14 NOTE — ED PROVIDER NOTE - ATTENDING CONTRIBUTION TO CARE
Rinku: I performed a face to face bedside interview with patient regarding history of present illness, review of symptoms and past medical history. I completed an independent physical exam.  I have discussed patient's plan of care with resident.   I agree with note as stated above including HISTORY OF PRESENT ILLNESS, HIV, PAST MEDICAL/SURGICAL/FAMILY/SOCIAL HISTORY, ALLERGIES AND HOME MEDICATIONS, REVIEW OF SYSTEMS, PHYSICAL EXAM, MEDICAL DECISION MAKING and any PROGRESS NOTES during the time I functioned as the attending physician for this patient unless otherwise noted. My brief assessment is as follows: 53 y f with pmh of cholelithiasis, breast ca in remission (Last chemo or radiation was 2009, no current hormone therapy), asthma, hysterectomy, partial nephrectomy, breast reconstruction  presenting for stabbing chest pain that started 3 hours ago.  Vital stable, EKG within normal limits, chest x-ray without acute findings.  High-sensitivity troponin negative.  Atypical for ACS.  Reviewed results with patient, ready for DC with cardiology follow-up.  Return precautions given.

## 2024-04-14 NOTE — ED PROVIDER NOTE - CLINICAL SUMMARY MEDICAL DECISION MAKING FREE TEXT BOX
53 y f with pmh of cholelithiasis, breast ca in remission (Last chemo or radiation was 2009, no current hormone therapy), asthma, hysterectomy, partial nephrectomy, breast reconstruction  presenting for stabbing chest pain that started 3 hours ago. 53 y f with pmh of cholelithiasis, breast ca in remission (Last chemo or radiation was 2009, no current hormone therapy), asthma, hysterectomy, partial nephrectomy, breast reconstruction  presenting for stabbing chest pain that started 3 hours ago.  Vital stable, EKG within normal limits, chest x-ray without acute findings.  High-sensitivity troponin negative.  Atypical for ACS.  Reviewed results with patient, ready for DC with cardiology follow-up.  Return precautions given.

## 2024-06-24 ENCOUNTER — NON-APPOINTMENT (OUTPATIENT)
Age: 54
End: 2024-06-24

## 2024-07-19 ENCOUNTER — NON-APPOINTMENT (OUTPATIENT)
Age: 54
End: 2024-07-19

## 2024-12-18 ENCOUNTER — APPOINTMENT (OUTPATIENT)
Dept: NEUROLOGY | Facility: CLINIC | Age: 54
End: 2024-12-18
Payer: COMMERCIAL

## 2024-12-18 VITALS
BODY MASS INDEX: 37.8 KG/M2 | HEIGHT: 66.5 IN | SYSTOLIC BLOOD PRESSURE: 112 MMHG | WEIGHT: 238 LBS | DIASTOLIC BLOOD PRESSURE: 74 MMHG | HEART RATE: 79 BPM

## 2024-12-18 DIAGNOSIS — G62.9 POLYNEUROPATHY, UNSPECIFIED: ICD-10-CM

## 2024-12-18 PROCEDURE — G2211 COMPLEX E/M VISIT ADD ON: CPT | Mod: NC

## 2024-12-18 PROCEDURE — 99214 OFFICE O/P EST MOD 30 MIN: CPT

## 2025-01-17 ENCOUNTER — APPOINTMENT (OUTPATIENT)
Dept: FAMILY MEDICINE | Facility: CLINIC | Age: 55
End: 2025-01-17
Payer: COMMERCIAL

## 2025-01-17 ENCOUNTER — LABORATORY RESULT (OUTPATIENT)
Age: 55
End: 2025-01-17

## 2025-01-17 VITALS
WEIGHT: 234 LBS | HEART RATE: 80 BPM | BODY MASS INDEX: 36.73 KG/M2 | HEIGHT: 67 IN | OXYGEN SATURATION: 99 % | DIASTOLIC BLOOD PRESSURE: 68 MMHG | SYSTOLIC BLOOD PRESSURE: 126 MMHG

## 2025-01-17 DIAGNOSIS — Z00.00 ENCOUNTER FOR GENERAL ADULT MEDICAL EXAMINATION W/OUT ABNORMAL FINDINGS: ICD-10-CM

## 2025-01-17 PROCEDURE — 36415 COLL VENOUS BLD VENIPUNCTURE: CPT

## 2025-01-17 PROCEDURE — 99396 PREV VISIT EST AGE 40-64: CPT

## 2025-01-17 RX ORDER — SEMAGLUTIDE 2.4 MG/.75ML
2.4 INJECTION, SOLUTION SUBCUTANEOUS
Refills: 0 | Status: ACTIVE | COMMUNITY

## 2025-01-20 LAB
ALBUMIN SERPL ELPH-MCNC: 4.4 G/DL
ALP BLD-CCNC: 78 U/L
ALT SERPL-CCNC: 22 U/L
ANION GAP SERPL CALC-SCNC: 11 MMOL/L
AST SERPL-CCNC: 15 U/L
BASOPHILS # BLD AUTO: 0.07 K/UL
BASOPHILS NFR BLD AUTO: 0.7 %
BILIRUB SERPL-MCNC: 0.2 MG/DL
BUN SERPL-MCNC: 21 MG/DL
CALCIUM SERPL-MCNC: 9.4 MG/DL
CHLORIDE SERPL-SCNC: 105 MMOL/L
CHOLEST SERPL-MCNC: 168 MG/DL
CO2 SERPL-SCNC: 25 MMOL/L
CREAT SERPL-MCNC: 0.84 MG/DL
EGFR: 82 ML/MIN/1.73M2
EOSINOPHIL # BLD AUTO: 0.24 K/UL
EOSINOPHIL NFR BLD AUTO: 2.3 %
ESTIMATED AVERAGE GLUCOSE: 111 MG/DL
GLUCOSE SERPL-MCNC: 89 MG/DL
HBA1C MFR BLD HPLC: 5.5 %
HCT VFR BLD CALC: 43.9 %
HDLC SERPL-MCNC: 51 MG/DL
HGB BLD-MCNC: 13.7 G/DL
IMM GRANULOCYTES NFR BLD AUTO: 0.4 %
LDLC SERPL CALC-MCNC: 97 MG/DL
LYMPHOCYTES # BLD AUTO: 1.98 K/UL
LYMPHOCYTES NFR BLD AUTO: 19.2 %
MAN DIFF?: NORMAL
MCHC RBC-ENTMCNC: 29 PG
MCHC RBC-ENTMCNC: 31.2 G/DL
MCV RBC AUTO: 92.8 FL
MONOCYTES # BLD AUTO: 0.67 K/UL
MONOCYTES NFR BLD AUTO: 6.5 %
NEUTROPHILS # BLD AUTO: 7.32 K/UL
NEUTROPHILS NFR BLD AUTO: 70.9 %
NONHDLC SERPL-MCNC: 117 MG/DL
PLATELET # BLD AUTO: 269 K/UL
POTASSIUM SERPL-SCNC: 4.8 MMOL/L
PROT SERPL-MCNC: 7.5 G/DL
RBC # BLD: 4.73 M/UL
RBC # FLD: 14 %
SODIUM SERPL-SCNC: 141 MMOL/L
T4 FREE SERPL-MCNC: 1 NG/DL
TRIGL SERPL-MCNC: 112 MG/DL
TSH SERPL-ACNC: 2.98 UIU/ML
WBC # FLD AUTO: 10.32 K/UL

## 2025-01-21 ENCOUNTER — NON-APPOINTMENT (OUTPATIENT)
Age: 55
End: 2025-01-21

## 2025-01-21 ENCOUNTER — TRANSCRIPTION ENCOUNTER (OUTPATIENT)
Age: 55
End: 2025-01-21

## 2025-01-21 LAB
APPEARANCE: CLEAR
BILIRUBIN URINE: NEGATIVE
BLOOD URINE: ABNORMAL
COLOR: YELLOW
GLUCOSE QUALITATIVE U: NEGATIVE MG/DL
KETONES URINE: NEGATIVE MG/DL
LEUKOCYTE ESTERASE URINE: NEGATIVE
NITRITE URINE: NEGATIVE
PH URINE: 6
PROTEIN URINE: NEGATIVE MG/DL
SPECIFIC GRAVITY URINE: 1.02
UROBILINOGEN URINE: 0.2 MG/DL

## 2025-01-27 ENCOUNTER — APPOINTMENT (OUTPATIENT)
Dept: FAMILY MEDICINE | Facility: CLINIC | Age: 55
End: 2025-01-27

## 2025-01-27 ENCOUNTER — NON-APPOINTMENT (OUTPATIENT)
Age: 55
End: 2025-01-27

## 2025-01-27 VITALS — TEMPERATURE: 209.3 F

## 2025-01-27 VITALS
HEART RATE: 95 BPM | WEIGHT: 233 LBS | SYSTOLIC BLOOD PRESSURE: 124 MMHG | BODY MASS INDEX: 36.49 KG/M2 | DIASTOLIC BLOOD PRESSURE: 76 MMHG

## 2025-01-27 VITALS — OXYGEN SATURATION: 97 %

## 2025-01-27 DIAGNOSIS — R05.9 COUGH, UNSPECIFIED: ICD-10-CM

## 2025-01-27 PROCEDURE — 99213 OFFICE O/P EST LOW 20 MIN: CPT

## 2025-01-29 LAB
INFLUENZA A RESULT: DETECTED
INFLUENZA B RESULT: NOT DETECTED
RESP SYN VIRUS RESULT: NOT DETECTED
SARS-COV-2 RESULT: NOT DETECTED

## 2025-02-04 ENCOUNTER — APPOINTMENT (OUTPATIENT)
Dept: ORTHOPEDIC SURGERY | Facility: CLINIC | Age: 55
End: 2025-02-04
Payer: COMMERCIAL

## 2025-02-04 VITALS — WEIGHT: 233 LBS | HEIGHT: 67 IN | BODY MASS INDEX: 36.57 KG/M2

## 2025-02-04 DIAGNOSIS — S82.025A NONDISPLACED LONGITUDINAL FRACTURE OF LEFT PATELLA, INITIAL ENCOUNTER FOR CLOSED FRACTURE: ICD-10-CM

## 2025-02-04 DIAGNOSIS — S82.092A OTHER FRACTURE OF LEFT PATELLA, INITIAL ENCOUNTER FOR CLOSED FRACTURE: ICD-10-CM

## 2025-02-04 PROCEDURE — 99203 OFFICE O/P NEW LOW 30 MIN: CPT | Mod: 57

## 2025-02-04 PROCEDURE — 27520 TREAT KNEECAP FRACTURE: CPT | Mod: LT

## 2025-02-04 PROCEDURE — L1830: CPT | Mod: LT

## 2025-02-04 PROCEDURE — 73564 X-RAY EXAM KNEE 4 OR MORE: CPT | Mod: LT

## 2025-02-04 RX ORDER — TIRZEPATIDE 2.5 MG/.5ML
2.5 INJECTION, SOLUTION SUBCUTANEOUS
Refills: 0 | Status: ACTIVE | COMMUNITY

## 2025-02-04 RX ORDER — MELOXICAM 15 MG/1
15 TABLET ORAL
Qty: 30 | Refills: 1 | Status: ACTIVE | COMMUNITY
Start: 2025-02-04 | End: 1900-01-01

## 2025-02-11 ENCOUNTER — APPOINTMENT (OUTPATIENT)
Dept: ORTHOPEDIC SURGERY | Facility: CLINIC | Age: 55
End: 2025-02-11
Payer: COMMERCIAL

## 2025-02-11 VITALS — WEIGHT: 233 LBS | BODY MASS INDEX: 36.57 KG/M2 | HEIGHT: 67 IN

## 2025-02-11 PROBLEM — S82.025D CLOSED NONDISPLACED LONGITUDINAL FRACTURE OF LEFT PATELLA WITH ROUTINE HEALING, SUBSEQUENT ENCOUNTER: Status: ACTIVE | Noted: 2025-02-11

## 2025-02-11 PROCEDURE — 99024 POSTOP FOLLOW-UP VISIT: CPT

## 2025-02-11 PROCEDURE — 73562 X-RAY EXAM OF KNEE 3: CPT | Mod: LT

## 2025-02-18 ENCOUNTER — APPOINTMENT (OUTPATIENT)
Dept: ORTHOPEDIC SURGERY | Facility: CLINIC | Age: 55
End: 2025-02-18
Payer: COMMERCIAL

## 2025-02-18 VITALS — BODY MASS INDEX: 36.57 KG/M2 | HEIGHT: 67 IN | WEIGHT: 233 LBS

## 2025-02-18 DIAGNOSIS — S82.025D NONDISPLACED LONGITUDINAL FRACTURE OF LEFT PATELLA, SUBSEQUENT ENCOUNTER FOR CLOSED FRACTURE WITH ROUTINE HEALING: ICD-10-CM

## 2025-02-18 PROCEDURE — 99024 POSTOP FOLLOW-UP VISIT: CPT

## 2025-02-18 PROCEDURE — 73562 X-RAY EXAM OF KNEE 3: CPT | Mod: LT

## 2025-03-04 ENCOUNTER — APPOINTMENT (OUTPATIENT)
Dept: ORTHOPEDIC SURGERY | Facility: CLINIC | Age: 55
End: 2025-03-04
Payer: COMMERCIAL

## 2025-03-04 VITALS — BODY MASS INDEX: 36.57 KG/M2 | HEIGHT: 67 IN | WEIGHT: 233 LBS

## 2025-03-04 DIAGNOSIS — S82.025D NONDISPLACED LONGITUDINAL FRACTURE OF LEFT PATELLA, SUBSEQUENT ENCOUNTER FOR CLOSED FRACTURE WITH ROUTINE HEALING: ICD-10-CM

## 2025-03-04 PROCEDURE — 99024 POSTOP FOLLOW-UP VISIT: CPT

## 2025-03-04 PROCEDURE — 73562 X-RAY EXAM OF KNEE 3: CPT | Mod: LT

## 2025-03-06 NOTE — ED ADULT NURSE NOTE - NS TRANSFER PATIENT BELONGINGS
CC: \"really well\"    INTERVAL HISTORY (In Person): Mr. Membreno is a 24-year-old single transgender female (not yet using female pronouns) who is following up with me 6 months since his last appointment re: a history of Major Depression in context of some family stress--father () stated he couldn't ever  him due to gender related issues (non-binary; male partner). He was having some SI and was briefly hospitalized at [facility] (7/25-7/30/24), but it resolved quickly in the hospital. I started WB XL in the hospital, but he noticed he was getting a little hypomanic post-D/C so I dropped the dose to 150 mg, and he was doing well at  the F/U appt last time.      He comes in today and states that he's been feeling \"really good\" emotionally, and that he's started taking hormones as the first step in transitioning to a female. He hasn't changed pronouns or made other changes yet, but he plans to and he's noticed how much better he's been feeling. He's not nearly as anxious or uncomfortable, and it's allowed him to ask for a raise at work, and be more comfortable around other people. He's not had any depression arise, and his N/V functioning has still been very good. No SE's with the WB XL and he wants to stay on it for now. He will be meeting with his PCP soon, and may ask her to refill his meds going forward. We discussed a number of issues re: being transgender, and ways to help acclimate his parents to the change as well. He's still in therapy, as well.       CURRENT MEDICATION:\  Wellbutrin  mg daily     MENTAL STATUS EXAM:  Efe was noted to be a casually dressed, adequately groomed, 24-year-old who appeared his stated age.  He was very pleasant and cooperative, and exhibited a full affective range again this time. He was slightly anxious and minimally fidgety, but that's c/w his baseline. He denied feeling hopeless or having any suicidal thoughts at all, and he also denied any feelings of hypomania or mood 
Clothing

## 2025-03-18 ENCOUNTER — APPOINTMENT (OUTPATIENT)
Dept: ORTHOPEDIC SURGERY | Facility: CLINIC | Age: 55
End: 2025-03-18
Payer: COMMERCIAL

## 2025-03-18 VITALS — HEIGHT: 67 IN | BODY MASS INDEX: 36.57 KG/M2 | WEIGHT: 233 LBS

## 2025-03-18 DIAGNOSIS — S82.025D NONDISPLACED LONGITUDINAL FRACTURE OF LEFT PATELLA, SUBSEQUENT ENCOUNTER FOR CLOSED FRACTURE WITH ROUTINE HEALING: ICD-10-CM

## 2025-03-18 PROCEDURE — 99024 POSTOP FOLLOW-UP VISIT: CPT

## 2025-03-18 PROCEDURE — 73562 X-RAY EXAM OF KNEE 3: CPT | Mod: LT

## 2025-05-04 ENCOUNTER — NON-APPOINTMENT (OUTPATIENT)
Age: 55
End: 2025-05-04

## 2025-05-17 ENCOUNTER — TRANSCRIPTION ENCOUNTER (OUTPATIENT)
Age: 55
End: 2025-05-17

## 2025-05-20 ENCOUNTER — NON-APPOINTMENT (OUTPATIENT)
Age: 55
End: 2025-05-20

## 2025-05-22 ENCOUNTER — APPOINTMENT (OUTPATIENT)
Dept: FAMILY MEDICINE | Facility: CLINIC | Age: 55
End: 2025-05-22

## 2025-05-23 ENCOUNTER — OFFICE (OUTPATIENT)
Dept: URBAN - METROPOLITAN AREA CLINIC 41 | Facility: CLINIC | Age: 55
Setting detail: OPHTHALMOLOGY
End: 2025-05-23
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
## 2025-05-23 PROCEDURE — NO SHOW FE NO SHOW FEE: Performed by: OPHTHALMOLOGY

## 2025-05-27 ENCOUNTER — APPOINTMENT (OUTPATIENT)
Dept: ORTHOPEDIC SURGERY | Facility: CLINIC | Age: 55
End: 2025-05-27

## 2025-06-02 ENCOUNTER — NON-APPOINTMENT (OUTPATIENT)
Age: 55
End: 2025-06-02

## 2025-06-03 ENCOUNTER — APPOINTMENT (OUTPATIENT)
Dept: FAMILY MEDICINE | Facility: CLINIC | Age: 55
End: 2025-06-03

## 2025-06-03 VITALS
SYSTOLIC BLOOD PRESSURE: 114 MMHG | DIASTOLIC BLOOD PRESSURE: 76 MMHG | OXYGEN SATURATION: 98 % | WEIGHT: 214 LBS | BODY MASS INDEX: 33.59 KG/M2 | HEART RATE: 78 BPM | HEIGHT: 67 IN

## 2025-06-03 DIAGNOSIS — R74.01 ELEVATION OF LEVELS OF LIVER TRANSAMINASE LEVELS: ICD-10-CM

## 2025-06-03 DIAGNOSIS — K85.90 ACUTE PANCREATITIS WITHOUT NECROSIS OR INFECTION, UNSPECIFIED: ICD-10-CM

## 2025-06-03 PROCEDURE — 99496 TRANSJ CARE MGMT HIGH F2F 7D: CPT

## 2025-06-03 PROCEDURE — 36415 COLL VENOUS BLD VENIPUNCTURE: CPT

## 2025-06-04 DIAGNOSIS — R79.89 OTHER SPECIFIED ABNORMAL FINDINGS OF BLOOD CHEMISTRY: ICD-10-CM

## 2025-06-04 LAB
ALBUMIN SERPL ELPH-MCNC: 4.3 G/DL
ALP BLD-CCNC: 268 U/L
ALT SERPL-CCNC: 324 U/L
AMYLASE/CREAT SERPL: 80 U/L
ANION GAP SERPL CALC-SCNC: 20 MMOL/L
AST SERPL-CCNC: 62 U/L
BASOPHILS # BLD AUTO: 0.08 K/UL
BASOPHILS NFR BLD AUTO: 0.8 %
BILIRUB SERPL-MCNC: 0.9 MG/DL
BUN SERPL-MCNC: 22 MG/DL
CALCIUM SERPL-MCNC: 9.5 MG/DL
CHLORIDE SERPL-SCNC: 104 MMOL/L
CO2 SERPL-SCNC: 20 MMOL/L
CREAT SERPL-MCNC: 0.65 MG/DL
EGFRCR SERPLBLD CKD-EPI 2021: 104 ML/MIN/1.73M2
EOSINOPHIL # BLD AUTO: 0.28 K/UL
EOSINOPHIL NFR BLD AUTO: 2.6 %
GLUCOSE SERPL-MCNC: 70 MG/DL
HCT VFR BLD CALC: 42.3 %
HGB BLD-MCNC: 13.3 G/DL
IMM GRANULOCYTES NFR BLD AUTO: 0.6 %
LPL SERPL-CCNC: 106 U/L
LYMPHOCYTES # BLD AUTO: 2.77 K/UL
LYMPHOCYTES NFR BLD AUTO: 26.1 %
MAN DIFF?: NORMAL
MCHC RBC-ENTMCNC: 29.4 PG
MCHC RBC-ENTMCNC: 31.4 G/DL
MCV RBC AUTO: 93.6 FL
MONOCYTES # BLD AUTO: 0.74 K/UL
MONOCYTES NFR BLD AUTO: 7 %
NEUTROPHILS # BLD AUTO: 6.67 K/UL
NEUTROPHILS NFR BLD AUTO: 62.9 %
PLATELET # BLD AUTO: 334 K/UL
POTASSIUM SERPL-SCNC: 5 MMOL/L
PROT SERPL-MCNC: 7.2 G/DL
RBC # BLD: 4.52 M/UL
RBC # FLD: 14.7 %
SODIUM SERPL-SCNC: 144 MMOL/L
WBC # FLD AUTO: 10.6 K/UL

## 2025-06-13 LAB
ALBUMIN SERPL ELPH-MCNC: 4.2 G/DL
ALP BLD-CCNC: 142 U/L
ALT SERPL-CCNC: 82 U/L
AMYLASE/CREAT SERPL: 61 U/L
AST SERPL-CCNC: 40 U/L
BASOPHILS # BLD AUTO: 0.08 K/UL
BASOPHILS NFR BLD AUTO: 0.7 %
BILIRUB DIRECT SERPL-MCNC: 0.36 MG/DL
BILIRUB INDIRECT SERPL-MCNC: 0.2 MG/DL
BILIRUB SERPL-MCNC: 0.6 MG/DL
EOSINOPHIL # BLD AUTO: 0.45 K/UL
EOSINOPHIL NFR BLD AUTO: 3.8 %
HCT VFR BLD CALC: 42.3 %
HGB BLD-MCNC: 13.4 G/DL
IMM GRANULOCYTES NFR BLD AUTO: 0.4 %
LPL SERPL-CCNC: 71 U/L
LYMPHOCYTES # BLD AUTO: 3.21 K/UL
LYMPHOCYTES NFR BLD AUTO: 27.4 %
MAN DIFF?: NORMAL
MCHC RBC-ENTMCNC: 29.6 PG
MCHC RBC-ENTMCNC: 31.7 G/DL
MCV RBC AUTO: 93.6 FL
MONOCYTES # BLD AUTO: 0.82 K/UL
MONOCYTES NFR BLD AUTO: 7 %
NEUTROPHILS # BLD AUTO: 7.12 K/UL
NEUTROPHILS NFR BLD AUTO: 60.7 %
PLATELET # BLD AUTO: 319 K/UL
PROT SERPL-MCNC: 7.1 G/DL
RBC # BLD: 4.52 M/UL
RBC # FLD: 14.6 %
WBC # FLD AUTO: 11.73 K/UL